# Patient Record
Sex: FEMALE | Race: WHITE | NOT HISPANIC OR LATINO | Employment: UNEMPLOYED | URBAN - METROPOLITAN AREA
[De-identification: names, ages, dates, MRNs, and addresses within clinical notes are randomized per-mention and may not be internally consistent; named-entity substitution may affect disease eponyms.]

---

## 2017-12-12 ENCOUNTER — OFFICE VISIT (OUTPATIENT)
Dept: MEDICAL GROUP | Facility: MEDICAL CENTER | Age: 57
End: 2017-12-12
Payer: COMMERCIAL

## 2017-12-12 VITALS
SYSTOLIC BLOOD PRESSURE: 100 MMHG | HEART RATE: 68 BPM | BODY MASS INDEX: 25.12 KG/M2 | TEMPERATURE: 98.3 F | OXYGEN SATURATION: 98 % | DIASTOLIC BLOOD PRESSURE: 60 MMHG | HEIGHT: 65 IN | WEIGHT: 150.8 LBS

## 2017-12-12 DIAGNOSIS — Z76.89 ENCOUNTER TO ESTABLISH CARE: ICD-10-CM

## 2017-12-12 DIAGNOSIS — Z00.00 ANNUAL PHYSICAL EXAM: ICD-10-CM

## 2017-12-12 DIAGNOSIS — Z00.00 HEALTH CARE MAINTENANCE: ICD-10-CM

## 2017-12-12 PROCEDURE — 99386 PREV VISIT NEW AGE 40-64: CPT | Performed by: INTERNAL MEDICINE

## 2017-12-12 ASSESSMENT — PATIENT HEALTH QUESTIONNAIRE - PHQ9: CLINICAL INTERPRETATION OF PHQ2 SCORE: 0

## 2017-12-12 ASSESSMENT — PAIN SCALES - GENERAL: PAINLEVEL: NO PAIN

## 2017-12-12 NOTE — LETTER
[facility]  Baron Kaminski M.D.  [address]  [place] [address]  Fax: [phone]   Authorization for Release/Disclosure of   Protected Health Information   Name: ROSEMARIE FALK : 1960 SSN: [id number]   Address: [address] [place] [address]  [place] [address] Phone:    [phone] (home)    I authorize the entity listed below to release/disclose the PHI below to:   Critical access hospital/Baron Kaminski M.D. and Baron Kaminski M.D.   Provider or Entity Name:  [facility]   City, State, Zip   Phone:      Fax:        Reason for request: continuity of care   Information to be released:    [ X ] LAST COLONOSCOPY,  including any PATH REPORT and follow-up  [ ] LAST FIT/COLOGUARD RESULT [  ] LAST DEXA  [  ] LAST MAMMOGRAM  [  ] LAST PAP  [  ] LAST LABS [  ] RETINA EXAM REPORT  [  ] IMMUNIZATION RECORDS  [  ] Release all info      [  ] Check here and initial the line next to each item to release ALL health information INCLUDING  _____ Care and treatment for drug and / or alcohol abuse  _____ HIV testing, infection status, or AIDS  _____ Genetic Testing    DATES OF SERVICE OR TIME PERIOD TO BE DISCLOSED: _____________  I understand and acknowledge that:  * This Authorization may be revoked at any time by you in writing, except if your health information has already been used or disclosed.  * Your health information that will be used or disclosed as a result of you signing this authorization could be re-disclosed by the recipient. If this occurs, your re-disclosed health information may no longer be protected by State or Federal laws.  * You may refuse to sign this Authorization. Your refusal will not affect your ability to obtain treatment.  * This Authorization becomes effective upon signing and will  on (date) __________.      If no date is indicated, this Authorization will  one (1) year from the signature date.    Name: Rosemarie Falk    Signature:   Date:          12/12/2017       PLEASE FAX REQUESTED RECORDS BACK TO: [phone]

## 2017-12-12 NOTE — LETTER
formerly [facility]  Baron Kaminski M.D.  [address]  [place] [address]  Fax: [phone]   Authorization for Release/Disclosure of   Protected Health Information   Name: ROSEMARIE CARVAJAL : 1960 SSN: [id number]   Address: [address] [place] [address]  [place] [address] Phone:    [phone] (home)    I authorize the entity listed below to release/disclose the PHI below to:   [facility]/Baron Kaminski M.D. and Baron Kaminski M.D.   Provider or Entity Name:  [facility]   Address   City, State, [facility]   Phone:      Fax:     Reason for request: continuity of care   Information to be released:    [  ] LAST COLONOSCOPY,  including any PATH REPORT and follow-up  [  ] LAST FIT/COLOGUARD RESULT [  ] LAST DEXA  [  ] LAST MAMMOGRAM  [  ] LAST PAP  [  ] LAST LABS [  ] RETINA EXAM REPORT  [  ] IMMUNIZATION RECORDS  [x] Release all info      [  ] Check here and initial the line next to each item to release ALL health information INCLUDING  _____ Care and treatment for drug and / or alcohol abuse  _____ HIV testing, infection status, or AIDS  _____ Genetic Testing    DATES OF SERVICE OR TIME PERIOD TO BE DISCLOSED: _____________  I understand and acknowledge that:  * This Authorization may be revoked at any time by you in writing, except if your health information has already been used or disclosed.  * Your health information that will be used or disclosed as a result of you signing this authorization could be re-disclosed by the recipient. If this occurs, your re-disclosed health information may no longer be protected by State or Federal laws.  * You may refuse to sign this Authorization. Your refusal will not affect your ability to obtain treatment.  * This Authorization becomes effective upon signing and will  on (date) __________.      If no date is indicated, this Authorization will  one (1) year from the signature date.    Name: Rosemarie Mora  Dileep    Signature:   Date:     12/12/2017       PLEASE FAX REQUESTED RECORDS BACK TO: [phone]

## 2017-12-12 NOTE — LETTER
[facility]  Baron Kaminski M.D.  [address]  [place] [address]  Fax: [phone]   Authorization for Release/Disclosure of   Protected Health Information   Name: ROSEMARIE FALK : 1960 SSN: [id number]   Address: [address] [place] [address]  [place] [address] Phone:    [phone] (home)    I authorize the entity listed below to release/disclose the PHI below to:   [facility]/Baron Kaminski M.D. and Baron Kaminski M.D.   Provider or Entity Name:  [facility]   City, State, Zip   Phone:      Fax:     Reason for request: continuity of care   Information to be released:    [  ] LAST COLONOSCOPY,  including any PATH REPORT and follow-up  [  ] LAST FIT/COLOGUARD RESULT [  ] LAST DEXA  [x] LAST MAMMOGRAM  [  ] LAST PAP  [  ] LAST LABS [  ] RETINA EXAM REPORT  [  ] IMMUNIZATION RECORDS  [  ] Release all info      [  ] Check here and initial the line next to each item to release ALL health information INCLUDING  _____ Care and treatment for drug and / or alcohol abuse  _____ HIV testing, infection status, or AIDS  _____ Genetic Testing    DATES OF SERVICE OR TIME PERIOD TO BE DISCLOSED: _____________  I understand and acknowledge that:  * This Authorization may be revoked at any time by you in writing, except if your health information has already been used or disclosed.  * Your health information that will be used or disclosed as a result of you signing this authorization could be re-disclosed by the recipient. If this occurs, your re-disclosed health information may no longer be protected by State or Federal laws.  * You may refuse to sign this Authorization. Your refusal will not affect your ability to obtain treatment.  * This Authorization becomes effective upon signing and will  on (date) __________.      If no date is indicated, this Authorization will  one (1) year from the signature date.    Name: Rosemarie Falk    Signature:   Date:          12/12/2017       PLEASE FAX REQUESTED RECORDS BACK TO: [phone]

## 2017-12-12 NOTE — PROGRESS NOTES
CHIEF COMPLIANT  Rosemarie Falk is a 57 y.o. female who presents for annual exam    Pt has GYN provider: no    Recommendations:  Regular exercise at least 4 days a week  Diet: advised balanced diet  Dental exam at least 1-2 times per year  Sunscreen use: advised    Immunizations:  TdaP:  Pending the records    Colonoscopy: 3 years ago, pending records     GYN  Last PAP:   2016, normal; pending records  Abnormal PAP: no  Last Mammography: 2017.  Pending records  Self breast exam: advised    Menarche:      Postmenopausal.  Denies hot flushes, sweating, vaginal dryness, mood swings.     CURRENT MEDICATIONS  Current Outpatient Prescriptions   Medication Sig Dispense Refill   • multivitamin (THERAGRAN) Tab Take 1 Tab by mouth every day.       No current facility-administered medications for this visit.      ALLERGIES  Allergies: Patient has no known allergies.  PAST MEDICAL HISTORY  She  has no past medical history on file.  SURGICAL HISTORY  She  has no past surgical history on file.  SOCIAL HISTORY  Social History   Substance Use Topics   • Smoking status: Never Smoker   • Smokeless tobacco: Never Used   • Alcohol use No     Social History     Social History Narrative   • No narrative on file     FAMILY HISTORY  History reviewed. No pertinent family history.  No family status information on file.     REVIEW OF SYSTEMS  Constitutional: Denies fever, chills, or sweats  Skin: negative for rash, scaling, itching, pigmentation, hair or nail changes.  Eyes: negative for visual blurring, double vision, eye pain, floaters and discharge from eyes  ENT: negative for tinnitus, vertigo, bleeding gums, dental problem and hoarseness, frequent URI's, sinus trouble, persistent sore throat  Respiratory: negative for persistent cough, hemoptysis, dyspnea, recurrent pneumonia or bronchitis, asthma and wheezing  Cardiovascular: negative for palpitations, tachycardia, irregular heart beat, chest pain, or peripheral edema.  Breast:  "Denies breast tenderness, mass, discharge, changes in size or contour, or abnormal cyclic discomfort.  Gastrointestinal: negative for poor appetite, dysphagia, nausea, heartburn or reflux, abdominal pain, hemorrhoids, constipation or diarrhea  Genitourinary: negative for dysuria, frequency, hesitancy, incontinence, sexually transmitted disease, abnormal vaginal discharge/bleeding, dysparunia   Musculoskeletal: negative for joint swelling and muscle pain/ soreness  Neuro: negative for migraine headaches, involuntary movements or tremor  Psychiatric: negative for excessive alcohol consumption or illegal drug use, sleep problems, anxiety, depression, sexual difficulties  Hematologic/Lymphatic/Immunologic: negative for anemia, unusual bruising, swollen glands, HIV risk factors  Endocrine: negative for temperature intolerance, polydipsia, polyuria.     PHYSICAL EXAMINATION:  Blood pressure 100/60, pulse 68, temperature 36.8 °C (98.3 °F), height 1.645 m (5' 4.75\"), weight 68.4 kg (150 lb 12.8 oz), SpO2 98 %.  Body mass index is 25.29 kg/m².  Wt Readings from Last 4 Encounters:   12/12/17 68.4 kg (150 lb 12.8 oz)       General appearance:healthy, well developed, well nourished, well-groomed  Psych: alert, no distress, cooperative. Eye contact good, speech goal directed. Affect calm.   Eyes: conjunctivae and sclerae normal, lids and lashes normal, EOMI, PERRLA  ENT: Ears: external ears normal to inspection, canals clear. TMs pearly gray with normal light reflex and anatomic landmarks, Nose/Sinuses: nose shows no deformity, asymmetry, or inflammation, nasal mucosa normal, no sinus tenderness,   Oropharynx: lips normal without lesions, buccal mucosa normal, gums healthy, teeth intact, non-carious, palate normal, tongue midline and normal  Neck: no asymmetry, masses, adenopathy. Thyroid normal to palpation, carotids without bruits, no jugular venous distention  Lungs: clear to auscultation with good excursion, Normal " respiratory rate. Chest symmetric no chest wall tenderness.  Cardiovascular: Regular rate and rhythm, no murmur.  No peripheral edema  Abdomen:  Soft, non-tender, no masses, HSM or palpable renal abnormalities. Bowel sounds normal, no bruits heard. No CVAT.  Musculoskeletal: no evidence of joint effusion, ROM of all joints is normal, no crepitation detected, strength grossly normal UE and LE, proximal and distal motor groups. No deformities present  Lymphatic: None significantly enlarged supraclavicular, axillary, or inguinal  Skin: color normal, vascularity normal, no rashes or suspicious lesions, no evidence of bleeding or bruising  Neuro: speech normal, mental status intact, gait grossly normal, muscle tone normal.    LABS    Pending    ASSESSMENT/PLAN    1. Annual physical exam  Healthy adult, no medications.    Reviewed PMH, PSH, FH, SH, ALL, MEDS, HCM/IMM.   Advised healthy habits, diet, exercise.  - CBC WITH DIFFERENTIAL; Future  - COMP METABOLIC PANEL; Future  - LIPID PROFILE; Future  - TSH; Future  - VITAMIN D,25 HYDROXY; Future    2. Health care maintenance  Pending records  - CBC WITH DIFFERENTIAL; Future  - COMP METABOLIC PANEL; Future  - LIPID PROFILE; Future  - TSH; Future  - VITAMIN D,25 HYDROXY; Future    3. Encounter to establish care  Release form for old records: signed    Smoking Counseling: Nonsmoker    Next office visit is due in  1 year    All questions are answered.     Please note that this dictation was created using voice recognition software. Despite all efforts, some minor grammar mistakes are possible.

## 2017-12-12 NOTE — LETTER
[facility]  Baron Kaminski M.D.  [address]  [place] [address]  Fax: [phone]   Authorization for Release/Disclosure of   Protected Health Information   Name: ROSEMARIE FALK : 1960 SSN: [id number]   Address: [address] [place] [address]  [place] [address] Phone:    [phone] (home)    I authorize the entity listed below to release/disclose the PHI below to:   [facility]/Baron Kaminski M.D. and Barno Kaminski M.D.   Provider or Entity Name:     Address   City, State, Zip   Phone:      Fax:     Reason for request: continuity of care   Information to be released:    [  ] LAST COLONOSCOPY,  including any PATH REPORT and follow-up  [  ] LAST FIT/COLOGUARD RESULT [  ] LAST DEXA  [  ] LAST MAMMOGRAM  [x] LAST PAP  [  ] LAST LABS [  ] RETINA EXAM REPORT  [  ] IMMUNIZATION RECORDS  [  ] Release all info      [  ] Check here and initial the line next to each item to release ALL health information INCLUDING  _____ Care and treatment for drug and / or alcohol abuse  _____ HIV testing, infection status, or AIDS  _____ Genetic Testing    DATES OF SERVICE OR TIME PERIOD TO BE DISCLOSED: _____________  I understand and acknowledge that:  * This Authorization may be revoked at any time by you in writing, except if your health information has already been used or disclosed.  * Your health information that will be used or disclosed as a result of you signing this authorization could be re-disclosed by the recipient. If this occurs, your re-disclosed health information may no longer be protected by State or Federal laws.  * You may refuse to sign this Authorization. Your refusal will not affect your ability to obtain treatment.  * This Authorization becomes effective upon signing and will  on (date) __________.      If no date is indicated, this Authorization will  one (1) year from the signature date.    Name: Rosemarie Falk    Signature:   Date:      12/12/2017       PLEASE FAX REQUESTED RECORDS BACK TO: [phone]

## 2018-03-14 ENCOUNTER — HOSPITAL ENCOUNTER (OUTPATIENT)
Dept: LAB | Facility: MEDICAL CENTER | Age: 58
End: 2018-03-14
Attending: INTERNAL MEDICINE
Payer: COMMERCIAL

## 2018-03-14 DIAGNOSIS — Z00.00 ANNUAL PHYSICAL EXAM: ICD-10-CM

## 2018-03-14 DIAGNOSIS — Z00.00 HEALTH CARE MAINTENANCE: ICD-10-CM

## 2018-03-14 LAB
25(OH)D3 SERPL-MCNC: 51 NG/ML (ref 30–100)
ALBUMIN SERPL BCP-MCNC: 4.4 G/DL (ref 3.2–4.9)
ALBUMIN/GLOB SERPL: 1.6 G/DL
ALP SERPL-CCNC: 33 U/L (ref 30–99)
ALT SERPL-CCNC: 16 U/L (ref 2–50)
ANION GAP SERPL CALC-SCNC: 5 MMOL/L (ref 0–11.9)
AST SERPL-CCNC: 23 U/L (ref 12–45)
BASOPHILS # BLD AUTO: 1.2 % (ref 0–1.8)
BASOPHILS # BLD: 0.06 K/UL (ref 0–0.12)
BILIRUB SERPL-MCNC: 0.7 MG/DL (ref 0.1–1.5)
BUN SERPL-MCNC: 14 MG/DL (ref 8–22)
CALCIUM SERPL-MCNC: 10.2 MG/DL (ref 8.5–10.5)
CHLORIDE SERPL-SCNC: 103 MMOL/L (ref 96–112)
CHOLEST SERPL-MCNC: 254 MG/DL (ref 100–199)
CO2 SERPL-SCNC: 29 MMOL/L (ref 20–33)
CREAT SERPL-MCNC: 0.75 MG/DL (ref 0.5–1.4)
EOSINOPHIL # BLD AUTO: 0.12 K/UL (ref 0–0.51)
EOSINOPHIL NFR BLD: 2.3 % (ref 0–6.9)
ERYTHROCYTE [DISTWIDTH] IN BLOOD BY AUTOMATED COUNT: 41.1 FL (ref 35.9–50)
GLOBULIN SER CALC-MCNC: 2.8 G/DL (ref 1.9–3.5)
GLUCOSE SERPL-MCNC: 92 MG/DL (ref 65–99)
HCT VFR BLD AUTO: 42.3 % (ref 37–47)
HDLC SERPL-MCNC: 71 MG/DL
HGB BLD-MCNC: 13.9 G/DL (ref 12–16)
IMM GRANULOCYTES # BLD AUTO: 0.01 K/UL (ref 0–0.11)
IMM GRANULOCYTES NFR BLD AUTO: 0.2 % (ref 0–0.9)
LDLC SERPL CALC-MCNC: 171 MG/DL
LYMPHOCYTES # BLD AUTO: 1.91 K/UL (ref 1–4.8)
LYMPHOCYTES NFR BLD: 37.2 % (ref 22–41)
MCH RBC QN AUTO: 28.1 PG (ref 27–33)
MCHC RBC AUTO-ENTMCNC: 32.9 G/DL (ref 33.6–35)
MCV RBC AUTO: 85.5 FL (ref 81.4–97.8)
MONOCYTES # BLD AUTO: 0.46 K/UL (ref 0–0.85)
MONOCYTES NFR BLD AUTO: 9 % (ref 0–13.4)
NEUTROPHILS # BLD AUTO: 2.57 K/UL (ref 2–7.15)
NEUTROPHILS NFR BLD: 50.1 % (ref 44–72)
NRBC # BLD AUTO: 0 K/UL
NRBC BLD-RTO: 0 /100 WBC
PLATELET # BLD AUTO: 324 K/UL (ref 164–446)
PMV BLD AUTO: 10.7 FL (ref 9–12.9)
POTASSIUM SERPL-SCNC: 4.4 MMOL/L (ref 3.6–5.5)
PROT SERPL-MCNC: 7.2 G/DL (ref 6–8.2)
RBC # BLD AUTO: 4.95 M/UL (ref 4.2–5.4)
SODIUM SERPL-SCNC: 137 MMOL/L (ref 135–145)
TRIGL SERPL-MCNC: 60 MG/DL (ref 0–149)
TSH SERPL DL<=0.005 MIU/L-ACNC: 2.65 UIU/ML (ref 0.38–5.33)
WBC # BLD AUTO: 5.1 K/UL (ref 4.8–10.8)

## 2018-03-14 PROCEDURE — 85025 COMPLETE CBC W/AUTO DIFF WBC: CPT

## 2018-03-14 PROCEDURE — 36415 COLL VENOUS BLD VENIPUNCTURE: CPT

## 2018-03-14 PROCEDURE — 80053 COMPREHEN METABOLIC PANEL: CPT

## 2018-03-14 PROCEDURE — 84443 ASSAY THYROID STIM HORMONE: CPT

## 2018-03-14 PROCEDURE — 80061 LIPID PANEL: CPT

## 2018-03-14 PROCEDURE — 82306 VITAMIN D 25 HYDROXY: CPT

## 2018-08-15 RX ORDER — NEOMYCIN SULFATE, POLYMYXIN B SULFATE AND DEXAMETHASONE 3.5; 10000; 1 MG/ML; [USP'U]/ML; MG/ML
SUSPENSION/ DROPS OPHTHALMIC
COMMUNITY
Start: 2017-08-17 | End: 2018-08-16

## 2018-08-16 ENCOUNTER — OFFICE VISIT (OUTPATIENT)
Dept: MEDICAL GROUP | Facility: MEDICAL CENTER | Age: 58
End: 2018-08-16
Payer: COMMERCIAL

## 2018-08-16 ENCOUNTER — HOSPITAL ENCOUNTER (OUTPATIENT)
Facility: MEDICAL CENTER | Age: 58
End: 2018-08-16
Attending: INTERNAL MEDICINE
Payer: COMMERCIAL

## 2018-08-16 VITALS
DIASTOLIC BLOOD PRESSURE: 72 MMHG | OXYGEN SATURATION: 97 % | HEART RATE: 68 BPM | TEMPERATURE: 98.7 F | HEIGHT: 65 IN | SYSTOLIC BLOOD PRESSURE: 102 MMHG | WEIGHT: 149.69 LBS | BODY MASS INDEX: 24.94 KG/M2

## 2018-08-16 DIAGNOSIS — Z00.00 HEALTH CARE MAINTENANCE: ICD-10-CM

## 2018-08-16 DIAGNOSIS — Z12.4 SCREENING FOR MALIGNANT NEOPLASM OF CERVIX: ICD-10-CM

## 2018-08-16 DIAGNOSIS — M71.22 SYNOVIAL CYST OF LEFT POPLITEAL SPACE: ICD-10-CM

## 2018-08-16 DIAGNOSIS — Z01.419 WELL FEMALE EXAM WITH ROUTINE GYNECOLOGICAL EXAM: ICD-10-CM

## 2018-08-16 PROCEDURE — 88175 CYTOPATH C/V AUTO FLUID REDO: CPT

## 2018-08-16 PROCEDURE — 87624 HPV HI-RISK TYP POOLED RSLT: CPT

## 2018-08-16 PROCEDURE — 99214 OFFICE O/P EST MOD 30 MIN: CPT | Mod: 25 | Performed by: INTERNAL MEDICINE

## 2018-08-16 PROCEDURE — 99000 SPECIMEN HANDLING OFFICE-LAB: CPT | Performed by: INTERNAL MEDICINE

## 2018-08-16 PROCEDURE — 99396 PREV VISIT EST AGE 40-64: CPT | Performed by: INTERNAL MEDICINE

## 2018-08-16 NOTE — PROGRESS NOTES
CHIEF COMPLIANT:  Rosemarie Falk is a 58 y.o. female who presents today due to synovial cyst  She would like also to do PAP for annual exam    Pt has GYN provider: no    Recommendations:  Regular exercise at least 4 days a week  Diet: advised balanced diet  Dental exam at least 1-2 times per year  Sunscreen use: advised    Immunizations:  TdaP:  10/2011  Shingless vaccine: 9 advised    Colonoscopy:  Bone density test:   Gardiasil:     GYN  Last PAP:   normal   Abnormal PAP: no  Last Mammography: Pending    Postmenopausal  Denies hot flushes, sweating, vaginal dryness, mood swings.     Left popliteal swelling  · Onset: 3-4 weeks ago  · Location: left on the left side, more laterally  · Gradually increasing in size.  · Bending of the knee is affected due to swelling.    Denies:  · Trauma  · Fever, chills, redness, pain, TTP  · Numbness, tingling    CURRENT MEDICATIONS  Current Outpatient Prescriptions   Medication Sig Dispense Refill   • tretinoin (RETIN-A) 0.025 % cream Apply  to affected area(s).     • multivitamin (THERAGRAN) Tab Take 1 Tab by mouth every day.       No current facility-administered medications for this visit.      ALLERGIES  Allergies: Patient has no known allergies.  PAST MEDICAL HISTORY  She  has no past medical history on file.  SURGICAL HISTORY  She  has no past surgical history on file.  SOCIAL HISTORY  Social History   Substance Use Topics   • Smoking status: Never Smoker   • Smokeless tobacco: Never Used   • Alcohol use No     Social History     Social History Narrative   • No narrative on file     FAMILY HISTORY  History reviewed. No pertinent family history.  No family status information on file.     REVIEW OF SYSTEMS  Constitutional: Denies fever, chills, or sweats  Skin: negative for rash, scaling, itching, pigmentation, hair or nail changes.  Eyes: negative for visual blurring, double vision, eye pain, floaters and discharge from eyes  ENT: negative for tinnitus, vertigo,  "bleeding gums, dental problem and hoarseness, frequent URI's, sinus trouble, persistent sore throat  Respiratory: negative for persistent cough, hemoptysis, dyspnea, recurrent pneumonia or bronchitis, asthma and wheezing  Cardiovascular: negative for palpitations, tachycardia, irregular heart beat, chest pain, or peripheral edema.  Breast: Denies breast tenderness, mass, discharge, changes in size or contour, or abnormal cyclic discomfort.  Gastrointestinal: negative for poor appetite, dysphagia, nausea, heartburn or reflux, abdominal pain, hemorrhoids, constipation or diarrhea  Genitourinary: negative for dysuria, frequency, hesitancy, incontinence, sexually transmitted disease, abnormal vaginal discharge/bleeding, dysparunia   Musculoskeletal: negative for muscle pain/ soreness. And per HPI.  Neuro: negative for migraine headaches, involuntary movements or tremor  Psychiatric: negative for excessive alcohol consumption or illegal drug use, sleep problems, anxiety, depression, sexual difficulties  Hematologic/Lymphatic/Immunologic: negative for anemia, unusual bruising, swollen glands, HIV risk factors  Endocrine: negative for temperature intolerance, polydipsia, polyuria.     PHYSICAL EXAMINATION:  Blood pressure 102/72, pulse 68, temperature 37.1 °C (98.7 °F), height 1.651 m (5' 5\"), weight 67.9 kg (149 lb 11.1 oz), SpO2 97 %.  Body mass index is 24.91 kg/m².  Wt Readings from Last 4 Encounters:   08/16/18 67.9 kg (149 lb 11.1 oz)   12/12/17 68.4 kg (150 lb 12.8 oz)     General appearance:healthy, well developed, well nourished, well-groomed  Psych: alert, no distress, cooperative. Eye contact good, speech goal directed. Affect calm.   Eyes: conjunctivae and sclerae normal, lids and lashes normal, EOMI, PERRLA  ENT: Ears: external ears normal to inspection, canals clear. TMs pearly gray with normal light reflex and anatomic landmarks, Nose/Sinuses: nose shows no deformity, asymmetry, or inflammation, nasal mucosa " normal, no sinus tenderness,   Oropharynx: lips normal without lesions, buccal mucosa normal, gums healthy, teeth intact, non-carious, palate normal, tongue midline and normal  Neck: no asymmetry, masses, adenopathy. Thyroid normal to palpation, carotids without bruits, no jugular venous distention  Lungs: clear to auscultation with good excursion, Normal respiratory rate. Chest symmetric no chest wall tenderness.  Cardiovascular: Regular rate and rhythm, no murmur.  No peripheral edema  Breasts examined seated and supine. No skin changes, peau d'orange or nipple retraction. No axillary or supraclavicular adenopathy. No dominant, discrete, fixed, or suspicious masses found.    Abdomen:  Soft, non-tender, no masses, HSM or palpable renal abnormalities. Bowel sounds normal, no bruits heard. No CVAT.  Musculoskeletal:   Swelling/cyst like structure over the medial aspect of the left popliteal fossa, without skin change, redness, TTP.  no evidence of joint effusion,  no crepitation detected, strength grossly normal UE and LE, proximal and distal motor groups. No deformities present.  Lymphatic: None significantly enlarged supraclavicular, axillary, or inguinal  Skin: color normal, vascularity normal, no rashes or suspicious lesions, no evidence of bleeding or bruising  Neuro: speech normal, mental status intact, gait grossly normal, muscle tone normal.  GYN: No suprapubic tenderness.  Perineum and external genitalia normal without rash.   Vagina with without discharge, normal mucosa.  Cervix w/o visible lesions or discharge. No CMT  Uterus normal size, non-tender, no masses,   Adnexa w/o mass or tenderness.   PAP smear was obtained.    LABS    Labs are reviewed and discussed with a patient  Lab Results   Component Value Date/Time    CHOLSTRLTOT 254 (H) 03/14/2018 07:09 AM     (H) 03/14/2018 07:09 AM    HDL 71 03/14/2018 07:09 AM    TRIGLYCERIDE 60 03/14/2018 07:09 AM       Lab Results   Component Value Date/Time     SODIUM 137 03/14/2018 07:09 AM    POTASSIUM 4.4 03/14/2018 07:09 AM    CHLORIDE 103 03/14/2018 07:09 AM    CO2 29 03/14/2018 07:09 AM    GLUCOSE 92 03/14/2018 07:09 AM    BUN 14 03/14/2018 07:09 AM    CREATININE 0.75 03/14/2018 07:09 AM     Lab Results   Component Value Date/Time    ALKPHOSPHAT 33 03/14/2018 07:09 AM    ASTSGOT 23 03/14/2018 07:09 AM    ALTSGPT 16 03/14/2018 07:09 AM    TBILIRUBIN 0.7 03/14/2018 07:09 AM      Lab Results   Component Value Date/Time    WBC 5.1 03/14/2018 07:09 AM    RBC 4.95 03/14/2018 07:09 AM    HEMOGLOBIN 13.9 03/14/2018 07:09 AM    HEMATOCRIT 42.3 03/14/2018 07:09 AM    MCV 85.5 03/14/2018 07:09 AM    MCH 28.1 03/14/2018 07:09 AM    MCHC 32.9 (L) 03/14/2018 07:09 AM    MPV 10.7 03/14/2018 07:09 AM    NEUTSPOLYS 50.10 03/14/2018 07:09 AM    LYMPHOCYTES 37.20 03/14/2018 07:09 AM    MONOCYTES 9.00 03/14/2018 07:09 AM    EOSINOPHILS 2.30 03/14/2018 07:09 AM    BASOPHILS 1.20 03/14/2018 07:09 AM      ASSESSMENT/PLAN    1. Well female exam with routine gynecological exam  - THINPREP PAP WITH HPV; Future    2. Screening for malignant neoplasm of cervix  - THINPREP PAP WITH HPV; Future    3. Health care maintenance  As above.    4. Synovial cyst of left popliteal space  Showed her image from Internet, discussed about natural course.  - REFERRAL TO ORTHOPEDICS  - US-EXTREMITY NON VASCULAR UNILATERAL LEFT; Future    Smoking Counseling: Nonsmoker    Next office visit is due in  1 year and prn    All questions are answered.     Please note that this dictation was created using voice recognition software. Despite all efforts, some minor grammar mistakes are possible.

## 2018-08-17 ENCOUNTER — HOSPITAL ENCOUNTER (OUTPATIENT)
Dept: RADIOLOGY | Facility: MEDICAL CENTER | Age: 58
End: 2018-08-17
Attending: INTERNAL MEDICINE
Payer: COMMERCIAL

## 2018-08-17 DIAGNOSIS — Z12.4 SCREENING FOR MALIGNANT NEOPLASM OF CERVIX: ICD-10-CM

## 2018-08-17 DIAGNOSIS — Z01.419 WELL FEMALE EXAM WITH ROUTINE GYNECOLOGICAL EXAM: ICD-10-CM

## 2018-08-17 DIAGNOSIS — Z12.31 VISIT FOR SCREENING MAMMOGRAM: ICD-10-CM

## 2018-08-17 PROCEDURE — 77067 SCR MAMMO BI INCL CAD: CPT

## 2018-08-20 ENCOUNTER — HOSPITAL ENCOUNTER (OUTPATIENT)
Dept: RADIOLOGY | Facility: MEDICAL CENTER | Age: 58
End: 2018-08-20
Attending: INTERNAL MEDICINE
Payer: COMMERCIAL

## 2018-08-20 DIAGNOSIS — M71.22 SYNOVIAL CYST OF LEFT POPLITEAL SPACE: ICD-10-CM

## 2018-08-20 LAB
CYTOLOGY REG CYTOL: NORMAL
HPV HR 12 DNA CVX QL NAA+PROBE: NEGATIVE
HPV16 DNA SPEC QL NAA+PROBE: NEGATIVE
HPV18 DNA SPEC QL NAA+PROBE: NEGATIVE
SPECIMEN SOURCE: NORMAL

## 2018-08-20 PROCEDURE — 76882 US LMTD JT/FCL EVL NVASC XTR: CPT | Mod: LT

## 2018-08-30 ENCOUNTER — HOSPITAL ENCOUNTER (OUTPATIENT)
Dept: RADIOLOGY | Facility: MEDICAL CENTER | Age: 58
End: 2018-08-30

## 2019-02-08 DIAGNOSIS — Z12.83 SCREENING, MALIGNANT NEOPLASM, SKIN: ICD-10-CM

## 2019-02-17 DIAGNOSIS — E78.00 HYPERCHOLESTEROLEMIA: ICD-10-CM

## 2019-02-22 DIAGNOSIS — Z71.84 TRAVEL ADVICE ENCOUNTER: ICD-10-CM

## 2019-03-18 DIAGNOSIS — Z12.11 SCREENING FOR MALIGNANT NEOPLASM OF COLON: ICD-10-CM

## 2019-05-31 ENCOUNTER — APPOINTMENT (OUTPATIENT)
Dept: MEDICAL GROUP | Facility: MEDICAL CENTER | Age: 59
End: 2019-05-31
Payer: COMMERCIAL

## 2019-06-04 ENCOUNTER — OFFICE VISIT (OUTPATIENT)
Dept: MEDICAL GROUP | Facility: MEDICAL CENTER | Age: 59
End: 2019-06-04
Payer: COMMERCIAL

## 2019-06-04 VITALS
OXYGEN SATURATION: 97 % | TEMPERATURE: 97.2 F | BODY MASS INDEX: 23.56 KG/M2 | HEIGHT: 66 IN | HEART RATE: 67 BPM | DIASTOLIC BLOOD PRESSURE: 70 MMHG | WEIGHT: 146.61 LBS | SYSTOLIC BLOOD PRESSURE: 100 MMHG

## 2019-06-04 DIAGNOSIS — M62.838 MUSCLE SPASM: ICD-10-CM

## 2019-06-04 DIAGNOSIS — G89.29 CHRONIC LUMBOSACRAL PAIN: ICD-10-CM

## 2019-06-04 DIAGNOSIS — M54.50 CHRONIC LUMBOSACRAL PAIN: ICD-10-CM

## 2019-06-04 DIAGNOSIS — Z00.00 HEALTH CARE MAINTENANCE: ICD-10-CM

## 2019-06-04 DIAGNOSIS — Z11.59 NEED FOR HEPATITIS C SCREENING TEST: ICD-10-CM

## 2019-06-04 PROCEDURE — 99214 OFFICE O/P EST MOD 30 MIN: CPT | Performed by: INTERNAL MEDICINE

## 2019-06-04 RX ORDER — MELOXICAM 15 MG/1
15 TABLET ORAL DAILY
Qty: 60 TAB | Refills: 0 | Status: SHIPPED | OUTPATIENT
Start: 2019-06-04 | End: 2020-04-28

## 2019-06-04 RX ORDER — TIZANIDINE 4 MG/1
4 TABLET ORAL EVERY 6 HOURS PRN
Qty: 30 TAB | Refills: 3 | Status: SHIPPED | OUTPATIENT
Start: 2019-06-04 | End: 2020-04-28

## 2019-06-04 ASSESSMENT — PATIENT HEALTH QUESTIONNAIRE - PHQ9: CLINICAL INTERPRETATION OF PHQ2 SCORE: 0

## 2019-06-04 NOTE — PROGRESS NOTES
CHIEF COMPLAINT  LBP    HPI  Patient is a 59 y.o. female patient who presents today for the following     Lower back pain  New problem.  - Onset: remote   - worse in the last few mos  - Triger: no trauma  - located in: lower back  - intensity:  mild to moderate  - quality:  dull   - radiation:  no  - alleviating factors are:  rest, massage  -  exacerbating factors are:  activity  - accompanied:    - TTP   - no numbness, weakness, tingling, fever, chills  - course: as above  - imaging: none  - treatment: as above      Reviewed PMH, PSH, FH, SH, ALL, HCM/IMM, no changes  Reviewed MEDS, no changes    Patient Active Problem List    Diagnosis Date Noted   • Hypercholesterolemia 02/17/2019   • Synovial cyst of left popliteal space 08/16/2018   • Health care maintenance 12/12/2017     CURRENT MEDICATIONS  Current Outpatient Prescriptions   Medication Sig Dispense Refill   • tizanidine (ZANAFLEX) 4 MG Tab Take 1 Tab by mouth every 6 hours as needed. 30 Tab 3   • multivitamin (THERAGRAN) Tab Take 1 Tab by mouth every day.       No current facility-administered medications for this visit.      ALLERGIES  Allergies: Patient has no known allergies.  PAST MEDICAL HISTORY  No past medical history on file.  SURGICAL HISTORY  She  has no past surgical history on file.  SOCIAL HISTORY  Social History   Substance Use Topics   • Smoking status: Never Smoker   • Smokeless tobacco: Never Used   • Alcohol use No     Social History     Social History Narrative   • No narrative on file     FAMILY HISTORY  History reviewed. No pertinent family history.  No family status information on file.     ROS   Constitutional: Negative for fever, chills.  Eyes: Negative for vision problems.   Respiratory: Negative for cough, shortness of breath.  Cardiovascular: Negative for chest pain, palpitations.   Gastrointestinal: Negative for heartburn, nausea, abdominal pain.   Genitourinary: Negative for dysuria.  Musculoskeletal: as above.  Skin: Negative  "for rash and itching.   Neuro: Negative for dizziness, weakness and headaches.   Endo/Heme/Allergies: Does not bruise/bleed easily.   Psychiatric/Behavioral: Negative for depression.    PHYSICAL EXAM   /70 (BP Location: Left arm, Patient Position: Sitting, BP Cuff Size: Adult)   Pulse 67   Temp 36.2 °C (97.2 °F) (Temporal)   Ht 1.664 m (5' 5.5\")   Wt 66.5 kg (146 lb 9.7 oz)   SpO2 97%  Body mass index is 24.03 kg/m².  General:  NAD, well appearing  HEENT:   NC/AT, PERRLA, EOMI, TMs are clear. Oropharyngeal mucosa is pink,  without lesions;  no cervical / supraclavicular  lymphadenopathy, no thyromegaly.    Cardiovascular: RRR.   No m/r/g. No carotid bruits .      Lungs:   CTAB, no w/r/r, no respiratory distress.  Abdomen: Soft, NT/ND + BS; no suprapubic tenderness; no masses or hepatosplenomegaly.  Extremities:  2+ DP and radial pulses bilaterally.  No c/c/e.   Skin:  Warm, dry.  No erythema. No rash.   Neurologic: Alert & oriented x 3. CN II-XII grossly intact. Brachioradialis / knee DTR are 2/4, symmetric. Strength and sensation grossly intact.  No focal deficits.  Psychiatric:  Affect normal, mood normal, judgment normal.  MS: no TTP over spine/paraspinal muscles; mild muscle spasm.    LABS     Labs are reviewed and discussed with a patient  Lab Results   Component Value Date/Time    CHOLSTRLTOT 254 (H) 03/14/2018 07:09 AM     (H) 03/14/2018 07:09 AM    HDL 71 03/14/2018 07:09 AM    TRIGLYCERIDE 60 03/14/2018 07:09 AM       Lab Results   Component Value Date/Time    SODIUM 137 03/14/2018 07:09 AM    POTASSIUM 4.4 03/14/2018 07:09 AM    CHLORIDE 103 03/14/2018 07:09 AM    CO2 29 03/14/2018 07:09 AM    GLUCOSE 92 03/14/2018 07:09 AM    BUN 14 03/14/2018 07:09 AM    CREATININE 0.75 03/14/2018 07:09 AM     Lab Results   Component Value Date/Time    ALKPHOSPHAT 33 03/14/2018 07:09 AM    ASTSGOT 23 03/14/2018 07:09 AM    ALTSGPT 16 03/14/2018 07:09 AM    TBILIRUBIN 0.7 03/14/2018 07:09 AM      Lab " Results   Component Value Date/Time    WBC 5.1 03/14/2018 07:09 AM    RBC 4.95 03/14/2018 07:09 AM    HEMOGLOBIN 13.9 03/14/2018 07:09 AM    HEMATOCRIT 42.3 03/14/2018 07:09 AM    MCV 85.5 03/14/2018 07:09 AM    MCH 28.1 03/14/2018 07:09 AM    MCHC 32.9 (L) 03/14/2018 07:09 AM    MPV 10.7 03/14/2018 07:09 AM    NEUTSPOLYS 50.10 03/14/2018 07:09 AM    LYMPHOCYTES 37.20 03/14/2018 07:09 AM    MONOCYTES 9.00 03/14/2018 07:09 AM    EOSINOPHILS 2.30 03/14/2018 07:09 AM    BASOPHILS 1.20 03/14/2018 07:09 AM      IMAGING     None    ASSESMENT AND PLAN        1. Chronic lumbosacral pain  Advised to continue activity as tolerated  - REFERRAL TO PHYSICAL THERAPY Reason for Therapy: Eval/Treat/Report  - meloxicam (MOBIC) 15 MG tablet; Take 1 Tab by mouth every day.  Dispense: 60 Tab; Refill: 0    2. Muscle spasm  Trial of muscle relaxant  - tizanidine (ZANAFLEX) 4 MG Tab; Take 1 Tab by mouth every 6 hours as needed.  Dispense: 30 Tab; Refill: 3  - REFERRAL TO PHYSICAL THERAPY Reason for Therapy: Eval/Treat/Report    3. Health care maintenance  4. Need for hepatitis C screening test  - HEP C VIRUS ANTIBODY; Future    Counseling:   - Smoking:  Nonsmoker    Followup: Return if symptoms worsen or fail to improve.    All questions are answered.    Please note that this dictation was created using voice recognition software, and that there might be errors of kenney and possibly content.

## 2019-06-11 ENCOUNTER — HOSPITAL ENCOUNTER (OUTPATIENT)
Dept: LAB | Facility: MEDICAL CENTER | Age: 59
End: 2019-06-11
Attending: INTERNAL MEDICINE
Payer: COMMERCIAL

## 2019-06-11 DIAGNOSIS — Z11.59 NEED FOR HEPATITIS C SCREENING TEST: ICD-10-CM

## 2019-06-11 DIAGNOSIS — E78.00 HYPERCHOLESTEROLEMIA: ICD-10-CM

## 2019-06-11 LAB
ALBUMIN SERPL BCP-MCNC: 4.4 G/DL (ref 3.2–4.9)
ALBUMIN/GLOB SERPL: 1.5 G/DL
ALP SERPL-CCNC: 36 U/L (ref 30–99)
ALT SERPL-CCNC: 17 U/L (ref 2–50)
ANION GAP SERPL CALC-SCNC: 7 MMOL/L (ref 0–11.9)
AST SERPL-CCNC: 21 U/L (ref 12–45)
BILIRUB SERPL-MCNC: 0.5 MG/DL (ref 0.1–1.5)
BUN SERPL-MCNC: 17 MG/DL (ref 8–22)
CALCIUM SERPL-MCNC: 9.7 MG/DL (ref 8.5–10.5)
CHLORIDE SERPL-SCNC: 106 MMOL/L (ref 96–112)
CHOLEST SERPL-MCNC: 247 MG/DL (ref 100–199)
CO2 SERPL-SCNC: 28 MMOL/L (ref 20–33)
CREAT SERPL-MCNC: 0.76 MG/DL (ref 0.5–1.4)
FASTING STATUS PATIENT QL REPORTED: NORMAL
GLOBULIN SER CALC-MCNC: 3 G/DL (ref 1.9–3.5)
GLUCOSE SERPL-MCNC: 85 MG/DL (ref 65–99)
HCV AB SER QL: NEGATIVE
HDLC SERPL-MCNC: 69 MG/DL
LDLC SERPL CALC-MCNC: 163 MG/DL
POTASSIUM SERPL-SCNC: 4.2 MMOL/L (ref 3.6–5.5)
PROT SERPL-MCNC: 7.4 G/DL (ref 6–8.2)
SODIUM SERPL-SCNC: 141 MMOL/L (ref 135–145)
TRIGL SERPL-MCNC: 77 MG/DL (ref 0–149)

## 2019-06-11 PROCEDURE — 80053 COMPREHEN METABOLIC PANEL: CPT

## 2019-06-11 PROCEDURE — 36415 COLL VENOUS BLD VENIPUNCTURE: CPT

## 2019-06-11 PROCEDURE — 80061 LIPID PANEL: CPT

## 2019-06-11 PROCEDURE — 86803 HEPATITIS C AB TEST: CPT

## 2019-06-26 ENCOUNTER — OFFICE VISIT (OUTPATIENT)
Dept: MEDICAL GROUP | Facility: MEDICAL CENTER | Age: 59
End: 2019-06-26
Payer: COMMERCIAL

## 2019-06-26 VITALS
BODY MASS INDEX: 23.31 KG/M2 | OXYGEN SATURATION: 98 % | WEIGHT: 145.06 LBS | TEMPERATURE: 97.4 F | SYSTOLIC BLOOD PRESSURE: 100 MMHG | DIASTOLIC BLOOD PRESSURE: 66 MMHG | HEART RATE: 74 BPM | HEIGHT: 66 IN

## 2019-06-26 DIAGNOSIS — Z12.31 ENCOUNTER FOR SCREENING MAMMOGRAM FOR MALIGNANT NEOPLASM OF BREAST: ICD-10-CM

## 2019-06-26 DIAGNOSIS — M71.22 SYNOVIAL CYST OF LEFT POPLITEAL SPACE: ICD-10-CM

## 2019-06-26 DIAGNOSIS — Z78.0 MENOPAUSE: ICD-10-CM

## 2019-06-26 DIAGNOSIS — E78.00 HYPERCHOLESTEROLEMIA: ICD-10-CM

## 2019-06-26 DIAGNOSIS — Z00.00 ANNUAL PHYSICAL EXAM: ICD-10-CM

## 2019-06-26 DIAGNOSIS — Z00.00 HEALTH CARE MAINTENANCE: ICD-10-CM

## 2019-06-26 DIAGNOSIS — M54.50 CHRONIC LUMBOSACRAL PAIN: ICD-10-CM

## 2019-06-26 DIAGNOSIS — G89.29 CHRONIC LUMBOSACRAL PAIN: ICD-10-CM

## 2019-06-26 PROCEDURE — 99396 PREV VISIT EST AGE 40-64: CPT | Performed by: INTERNAL MEDICINE

## 2019-06-26 NOTE — PROGRESS NOTES
CHIEF COMPLIANT:  Rosemarie Falk is a 59 y.o. female who presents for annual exam    Pt has GYN provider: no     Recommendations:  Regular exercise at least 4 days a week  Diet: advised balanced diet  Dental exam at least 1-2 times per year  Sunscreen use: advised     Immunizations:  TdaP: 10/2011  Shingless vaccine: 9 advised     Colonoscopy: 4/2019  Bone density test: given order    GYN  Last PAP: 8/2018, normal   Abnormal PAP: no  Last Mammography: 8/2018     Postmenopausal  Denies hot flushes, sweating, vaginal dryness, mood swings.      Hypercholesterolemia  Meds: Started omega-3, will start niacin ER   - taking daily, as prescribed. No myalgias, muscle cramps or pain.    Diet / Exercise/BMI:  As above  FH: Father     Left popliteal swelling/cyst  Onset: Mid 2018.  No significant symptoms.    LBP  C/o pain in lumbosacral area.  Improved with myofascial massage.    CURRENT MEDICATIONS  Current Outpatient Prescriptions   Medication Sig Dispense Refill   • multivitamin (THERAGRAN) Tab Take 1 Tab by mouth every day.     • tizanidine (ZANAFLEX) 4 MG Tab Take 1 Tab by mouth every 6 hours as needed. (Patient not taking: Reported on 6/26/2019) 30 Tab 3   • meloxicam (MOBIC) 15 MG tablet Take 1 Tab by mouth every day. (Patient not taking: Reported on 6/26/2019) 60 Tab 0     No current facility-administered medications for this visit.      ALLERGIES  Allergies: Patient has no known allergies.  PAST MEDICAL HISTORY  She  has a past medical history of Hypercholesterolemia.  SURGICAL HISTORY  She  has no past surgical history on file.  SOCIAL HISTORY  Social History   Substance Use Topics   • Smoking status: Never Smoker   • Smokeless tobacco: Never Used   • Alcohol use No     Social History     Social History Narrative   • No narrative on file     FAMILY HISTORY  History reviewed. No pertinent family history.  No family status information on file.     REVIEW OF SYSTEMS  Constitutional: Denies fever, chills,  "fatigue.  Skin: negative for rash, scaling, itching, pigmentation, hair or nail changes.  Eyes: negative for blurred vision, eye pain, floaters and discharge from eyes.  ENT: negative for hearing problems, tinnitus, vertigo.  Respiratory: negative for persistent cough, dyspnea, wheezing, SOB.  Cardiovascular: negative for palpitations, tachycardia, irregular heart beats, chest pain, or peripheral edema.  Breast: Denies breast tenderness, mass, discharge, changes in size or contour, or abnormal cyclic discomfort.  Gastrointestinal: negative for anorexia, dysphagia, nausea, heartburn/reflux, abdominal pain, hemorrhoids, constipation or diarrhea.  Genitourinary: negative for dysuria, frequency, hesitancy, incontinence, abnormal vaginal discharge/bleeding.  Musculoskeletal: negative for back/joint pain, myalgia.   Neuro: negative for migraine headaches, involuntary movements or tremor  Psychiatric: negative for excessive alcohol use, illegal drug use, sleep problems, anxiety, depression.  Hematologic/Lymphatic/Immunologic: negative for anemia, unusual bruising, swollen glands.  Endocrine: negative for temperature intolerance, polydipsia, polyuria.     PHYSICAL EXAMINATION:  /66 (BP Location: Left arm)   Pulse 74   Temp 36.3 °C (97.4 °F) (Temporal)   Ht 1.664 m (5' 5.5\")   Wt 65.8 kg (145 lb 1 oz)   SpO2 98%   Body mass index is 23.77 kg/m².  Wt Readings from Last 4 Encounters:   06/26/19 65.8 kg (145 lb 1 oz)   06/04/19 66.5 kg (146 lb 9.7 oz)   08/16/18 67.9 kg (149 lb 11.1 oz)   12/12/17 68.4 kg (150 lb 12.8 oz)       General appearance:healthy, well developed, well nourished, well-groomed  Psych: alert, no distress, cooperative. Eye contact good, speech goal directed. Affect calm.   Eyes: conjunctivae and sclerae normal, lids and lashes normal, EOMI, PERRLA  ENT: Ears: external ears normal to inspection, canals clear. TMs pearly gray with normal light reflex and anatomic landmarks, Nose/Sinuses: nose shows " no deformity, asymmetry, or inflammation, nasal mucosa normal, no sinus tenderness,   Oropharynx: lips normal without lesions, buccal mucosa normal, gums healthy, teeth intact, non-carious, palate normal, tongue midline and normal  Neck: no asymmetry, masses, adenopathy. Thyroid normal to palpation, carotids without bruits, no jugular venous distention  Lungs: clear to auscultation with good excursion, Normal respiratory rate. Chest symmetric no chest wall tenderness.  Cardiovascular: Regular rate and rhythm, no murmur.  No peripheral edema  Breasts examined seated and supine. No skin changes, peau d'orange or nipple retraction. No axillary or supraclavicular adenopathy. No dominant, discrete, fixed, or suspicious masses found.    Abdomen:  Soft, non-tender, no masses, HSM or palpable renal abnormalities. Bowel sounds normal, no bruits heard. No CVAT.  Musculoskeletal: no evidence of joint effusion, ROM of all joints is normal, no crepitation detected, strength grossly normal UE and LE, proximal and distal motor groups. No deformities present  Lymphatic: None significantly enlarged supraclavicular, axillary, or inguinal  Skin: color normal, vascularity normal, no rashes or suspicious lesions, no evidence of bleeding or bruising  Neuro: speech normal, mental status intact, gait grossly normal, muscle tone normal.    LABS    Labs are reviewed and discussed with a patient  Lab Results   Component Value Date/Time    CHOLSTRLTOT 247 (H) 06/11/2019 07:03 AM     (H) 06/11/2019 07:03 AM    HDL 69 06/11/2019 07:03 AM    TRIGLYCERIDE 77 06/11/2019 07:03 AM       Lab Results   Component Value Date/Time    SODIUM 141 06/11/2019 07:03 AM    POTASSIUM 4.2 06/11/2019 07:03 AM    CHLORIDE 106 06/11/2019 07:03 AM    CO2 28 06/11/2019 07:03 AM    GLUCOSE 85 06/11/2019 07:03 AM    BUN 17 06/11/2019 07:03 AM    CREATININE 0.76 06/11/2019 07:03 AM     Lab Results   Component Value Date/Time    ALKPHOSPHAT 36 06/11/2019 07:03 AM     ASTSGOT 21 06/11/2019 07:03 AM    ALTSGPT 17 06/11/2019 07:03 AM    TBILIRUBIN 0.5 06/11/2019 07:03 AM      Lab Results   Component Value Date/Time    WBC 5.1 03/14/2018 07:09 AM    RBC 4.95 03/14/2018 07:09 AM    HEMOGLOBIN 13.9 03/14/2018 07:09 AM    HEMATOCRIT 42.3 03/14/2018 07:09 AM    MCV 85.5 03/14/2018 07:09 AM    MCH 28.1 03/14/2018 07:09 AM    MCHC 32.9 (L) 03/14/2018 07:09 AM    MPV 10.7 03/14/2018 07:09 AM    NEUTSPOLYS 50.10 03/14/2018 07:09 AM    LYMPHOCYTES 37.20 03/14/2018 07:09 AM    MONOCYTES 9.00 03/14/2018 07:09 AM    EOSINOPHILS 2.30 03/14/2018 07:09 AM    BASOPHILS 1.20 03/14/2018 07:09 AM      ASSESSMENT/PLAN    1. Annual physical exam  Reviewed PMH, PSH, FH, SH, ALL, MEDS, HCM/IMM.   Advised healthy habits, diet, exercise.    2. Health care maintenance  Per HPI    3. Encounter for screening mammogram for malignant neoplasm of breast  Due in 8/2019.  - MA-SCREEN MAMMO W/CAD-BILAT; Future    4. Menopause  - DS-BONE DENSITY STUDY (DEXA); Future    5. Hypercholesterolemia  Started: omega 3.  Will start niacian  mg BID  - Comp Metabolic Panel; Future  - Lipid Profile; Future  - Comp Metabolic Panel; Future  - Lipid Profile; Future    6. Synovial cyst of left popliteal space  No significant sx    7. Chronic lumbosacral pain  Improved, continue activity as tolerated and current tx.    Smoking Counseling: Nonsmoker    Next office visit is due in  1 year    All questions are answered.     Please note that this dictation was created using voice recognition software. Despite all efforts, some minor grammar mistakes are possible.

## 2019-07-01 ENCOUNTER — APPOINTMENT (OUTPATIENT)
Dept: PHYSICAL THERAPY | Facility: MEDICAL CENTER | Age: 59
End: 2019-07-01
Attending: INTERNAL MEDICINE
Payer: COMMERCIAL

## 2019-07-01 ENCOUNTER — HOSPITAL ENCOUNTER (OUTPATIENT)
Dept: RADIOLOGY | Facility: MEDICAL CENTER | Age: 59
End: 2019-07-01
Attending: INTERNAL MEDICINE
Payer: COMMERCIAL

## 2019-07-01 DIAGNOSIS — Z78.0 MENOPAUSE: ICD-10-CM

## 2019-07-01 PROCEDURE — 77080 DXA BONE DENSITY AXIAL: CPT

## 2019-07-19 ENCOUNTER — OFFICE VISIT (OUTPATIENT)
Dept: MEDICAL GROUP | Facility: MEDICAL CENTER | Age: 59
End: 2019-07-19
Payer: COMMERCIAL

## 2019-07-19 VITALS
HEIGHT: 65 IN | BODY MASS INDEX: 24.65 KG/M2 | DIASTOLIC BLOOD PRESSURE: 60 MMHG | SYSTOLIC BLOOD PRESSURE: 100 MMHG | TEMPERATURE: 98.1 F | WEIGHT: 147.93 LBS | HEART RATE: 70 BPM | OXYGEN SATURATION: 94 %
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
## 2019-07-19 DIAGNOSIS — B00.1 COLD SORE: ICD-10-CM

## 2019-07-19 PROCEDURE — 99214 OFFICE O/P EST MOD 30 MIN: CPT | Mod: 25 | Performed by: INTERNAL MEDICINE

## 2019-07-19 RX ORDER — VALACYCLOVIR HYDROCHLORIDE 500 MG/1
500 TABLET, FILM COATED ORAL 2 TIMES DAILY
Qty: 40 TAB | Refills: 0 | Status: SHIPPED | OUTPATIENT
Start: 2019-07-19 | End: 2019-07-19 | Stop reason: SDUPTHER

## 2019-07-19 RX ORDER — VALACYCLOVIR HYDROCHLORIDE 500 MG/1
500 TABLET, FILM COATED ORAL 2 TIMES DAILY
Qty: 40 TAB | Refills: 0 | Status: SHIPPED | OUTPATIENT
Start: 2019-07-19 | End: 2020-04-28

## 2019-07-19 RX ORDER — TRIAMCINOLONE ACETONIDE 40 MG/ML
40 INJECTION, SUSPENSION INTRA-ARTICULAR; INTRAMUSCULAR ONCE
Status: COMPLETED | OUTPATIENT
Start: 2019-07-19 | End: 2019-07-19

## 2019-07-19 RX ADMIN — TRIAMCINOLONE ACETONIDE 40 MG: 40 INJECTION, SUSPENSION INTRA-ARTICULAR; INTRAMUSCULAR at 14:27

## 2019-07-19 NOTE — PROGRESS NOTES
CHIEF COMPLAINT  Insect bite    HPI  Rosemarie Falk is a 59 y.o. female who presents today for the following     Insect bite  Occurrence: ~ 36 hrs ago  The patient did not feel insect bite, but developed fast and sudden development of redness, swelling, feeling of tightness in the bite area in the right upper arm, and bruise/hematoma;  -At the same time she noticed insect bites, seems on 2 spots in the same area.  She applied ice packs that helped, so it has shrunk slightly.    She has not have:  -Skin rash in other areas of the body  -Itching  -Lip, tongue, or throat swelling  -Wheezing, cough  -Nausea, vomiting, abdominal pain  -Dizziness, lightheadedness    Cold sore on the lower lip  At the same time, she developed cold sores on lobar week, with burning and pain, blister development.    Reviewed PMH, PSH, FH, SH, ALL, HCM/IMM, no changes  Reviewed MEDS, no changes    Patient Active Problem List    Diagnosis Date Noted   • Chronic lumbosacral pain 06/26/2019   • Hypercholesterolemia 02/17/2019   • Synovial cyst of left popliteal space 08/16/2018   • Health care maintenance 12/12/2017     CURRENT MEDICATIONS  Current Outpatient Prescriptions   Medication Sig Dispense Refill   • multivitamin (THERAGRAN) Tab Take 1 Tab by mouth every day.     • tizanidine (ZANAFLEX) 4 MG Tab Take 1 Tab by mouth every 6 hours as needed. (Patient not taking: Reported on 6/26/2019) 30 Tab 3   • meloxicam (MOBIC) 15 MG tablet Take 1 Tab by mouth every day. (Patient not taking: Reported on 7/19/2019) 60 Tab 0     Current Facility-Administered Medications   Medication Dose Route Frequency Provider Last Rate Last Dose   • triamcinolone acetonide (KENALOG-40) injection 40 mg  40 mg Intramuscular Once Baron Kaminski M.D.         ALLERGIES  Allergies: Patient has no known allergies.  PAST MEDICAL HISTORY  Past Medical History:   Diagnosis Date   • Hypercholesterolemia      SURGICAL HISTORY  She  has no past surgical history on  "file.  SOCIAL HISTORY  Social History   Substance Use Topics   • Smoking status: Never Smoker   • Smokeless tobacco: Never Used   • Alcohol use No     Social History     Social History Narrative   • No narrative on file     FAMILY HISTORY  No family history on file.  No family status information on file.     ROS   Constitutional: As above.  HENT: As above.  Eyes: Negative for blurred vision.   Respiratory: Negative for cough, shortness of breath.  Cardiovascular: Negative for chest pain, palpitations.   Gastrointestinal: Negative for heartburn, nausea, abdominal pain.   Musculoskeletal: Negative for muscle aches.  Skin: As above.  Neuro: Negative for dizziness, weakness and headaches.   Endo/Heme/Allergies: Does not bruise/bleed easily.   Psychiatric/Behavioral: Negative for depression.    PHYSICAL EXAM   /60   Pulse 70   Temp 36.7 °C (98.1 °F) (Temporal)   Ht 1.651 m (5' 5\")   Wt 67.1 kg (147 lb 14.9 oz)   SpO2 94%  Body mass index is 24.62 kg/m².  General:  NAD, well appearing  HEENT:   NC/AT, PERRLA, EOMI, TMs are clear.  Cold sore on lower lip.  Oropharyngeal mucosa is pink,  without lesions;  no cervical / supraclavicular  lymphadenopathy, no thyromegaly.    Cardiovascular: RRR.   No m/r/g.       Lungs:   CTAB, no w/r/r, no respiratory distress.  Abdomen: Soft, NT/ND; no hepatosplenomegaly.  Extremities:  2+ DP and radial pulses bilaterally.  No c/c/e.   Skin:  Warm, dry.    Right upper arm: Large bruise, redness, mild tenderness to palpation over the whole biceps.  Neurologic: Alert & oriented x 3. CN II-XII grossly intact. No focal deficits.  Psychiatric:  Affect normal, mood normal, judgment normal.    Labs     Labs are reviewed and discussed with a patient  Lab Results   Component Value Date/Time    CHOLSTRLTOT 247 (H) 06/11/2019 07:03 AM     (H) 06/11/2019 07:03 AM    HDL 69 06/11/2019 07:03 AM    TRIGLYCERIDE 77 06/11/2019 07:03 AM       Lab Results   Component Value Date/Time    SODIUM " 141 06/11/2019 07:03 AM    POTASSIUM 4.2 06/11/2019 07:03 AM    CHLORIDE 106 06/11/2019 07:03 AM    CO2 28 06/11/2019 07:03 AM    GLUCOSE 85 06/11/2019 07:03 AM    BUN 17 06/11/2019 07:03 AM    CREATININE 0.76 06/11/2019 07:03 AM     Lab Results   Component Value Date/Time    ALKPHOSPHAT 36 06/11/2019 07:03 AM    ASTSGOT 21 06/11/2019 07:03 AM    ALTSGPT 17 06/11/2019 07:03 AM    TBILIRUBIN 0.5 06/11/2019 07:03 AM      No results found for: HBA1C  No results found for: TSH  No results found for: FREET4    Lab Results   Component Value Date/Time    WBC 5.1 03/14/2018 07:09 AM    RBC 4.95 03/14/2018 07:09 AM    HEMOGLOBIN 13.9 03/14/2018 07:09 AM    HEMATOCRIT 42.3 03/14/2018 07:09 AM    MCV 85.5 03/14/2018 07:09 AM    MCH 28.1 03/14/2018 07:09 AM    MCHC 32.9 (L) 03/14/2018 07:09 AM    MPV 10.7 03/14/2018 07:09 AM    NEUTSPOLYS 50.10 03/14/2018 07:09 AM    LYMPHOCYTES 37.20 03/14/2018 07:09 AM    MONOCYTES 9.00 03/14/2018 07:09 AM    EOSINOPHILS 2.30 03/14/2018 07:09 AM    BASOPHILS 1.20 03/14/2018 07:09 AM      Imaging     None    Assessment and Plan     oRsemarie Falk is a 59 y.o. female    1. Insect bite, initial encounter  Advised to take cetirizine 10 mg daily, along with Benadryl 25 mg twice daily for the next few days  - triamcinolone acetonide (KENALOG-40) injection 40 mg; 1 mL by Intramuscular route Once.    2. Cold sore  Start:  - valACYclovir (VALTREX) 500 MG Tab; Take 1 Tab by mouth 2 times a day.  Dispense: 40 Tab; Refill: 0    Counseling:   - Smoking:  Nonsmoker    Followup: Return if symptoms worsen or fail to improve.    All questions are answered.    Please note that this dictation was created using voice recognition software, and that there might be errors of kenney and possibly content.

## 2019-08-21 ENCOUNTER — HOSPITAL ENCOUNTER (OUTPATIENT)
Dept: RADIOLOGY | Facility: MEDICAL CENTER | Age: 59
End: 2019-08-21
Attending: INTERNAL MEDICINE
Payer: COMMERCIAL

## 2019-08-21 DIAGNOSIS — Z12.31 SCREENING MAMMOGRAM, ENCOUNTER FOR: ICD-10-CM

## 2019-08-21 PROCEDURE — 77063 BREAST TOMOSYNTHESIS BI: CPT

## 2019-08-23 DIAGNOSIS — Z80.3 FAMILY HISTORY OF BREAST CANCER: ICD-10-CM

## 2019-09-09 ENCOUNTER — OFFICE VISIT (OUTPATIENT)
Dept: HEMATOLOGY ONCOLOGY | Facility: MEDICAL CENTER | Age: 59
End: 2019-09-09
Payer: COMMERCIAL

## 2019-09-09 ENCOUNTER — NON-PROVIDER VISIT (OUTPATIENT)
Dept: HEMATOLOGY ONCOLOGY | Facility: MEDICAL CENTER | Age: 59
End: 2019-09-09
Payer: COMMERCIAL

## 2019-09-09 VITALS
RESPIRATION RATE: 12 BRPM | WEIGHT: 144 LBS | SYSTOLIC BLOOD PRESSURE: 108 MMHG | HEIGHT: 64 IN | HEART RATE: 97 BPM | BODY MASS INDEX: 24.59 KG/M2 | TEMPERATURE: 98.6 F | OXYGEN SATURATION: 98 % | DIASTOLIC BLOOD PRESSURE: 60 MMHG

## 2019-09-09 VITALS
DIASTOLIC BLOOD PRESSURE: 60 MMHG | RESPIRATION RATE: 12 BRPM | HEART RATE: 97 BPM | WEIGHT: 144.29 LBS | HEIGHT: 65 IN | BODY MASS INDEX: 24.04 KG/M2 | OXYGEN SATURATION: 98 % | TEMPERATURE: 98.6 F | SYSTOLIC BLOOD PRESSURE: 108 MMHG

## 2019-09-09 DIAGNOSIS — Z80.8 FAMILY HISTORY OF MELANOMA: ICD-10-CM

## 2019-09-09 DIAGNOSIS — Z80.8 FAMILY HISTORY OF OTHER SPECIFIED MALIGNANT NEOPLASM: ICD-10-CM

## 2019-09-09 DIAGNOSIS — Z80.3 FAMILY HISTORY OF MALIGNANT NEOPLASM OF BREAST: ICD-10-CM

## 2019-09-09 DIAGNOSIS — Z80.3 FAMILY HISTORY OF BREAST CANCER: ICD-10-CM

## 2019-09-09 PROCEDURE — 99202 OFFICE O/P NEW SF 15 MIN: CPT | Performed by: MEDICAL GENETICS

## 2019-09-09 PROCEDURE — 36415 COLL VENOUS BLD VENIPUNCTURE: CPT | Performed by: MEDICAL GENETICS

## 2019-09-09 ASSESSMENT — PAIN SCALES - GENERAL
PAINLEVEL: NO PAIN
PAINLEVEL: NO PAIN

## 2019-09-09 NOTE — PROGRESS NOTES
GENETIC RISK ASSESSMENT    Rosemarie Falk is a 59 y.o. year old patient who was referred by \[facility]\"" for cancer genetic risk assessment.    Chief Complaint: Patient with family history of breast, ,melanoma     HPI: The patient does not carry a cancer diagnosis  Review of personal and family histories suggested that a cancer genetic risk assessment was in order.    Review of Systems (ROS):  Constitutional normal  Eyes noraml  ENT noraml   Respiratory normal  Cardiovascular normal  Gastrointestinal normal  Genitourinary normal  Musculoskeletal normal  Skin normal  Neurological greg  Endocrine normal  Psychiatric normal  Hematologic/lymphatic normal  Allergic/Immunologic none  All other systems reviewed and are negative.    History (Past/Family)  Family History:   No family history on file.   3 generation pedigree built   Yes   [place] empiric risk calculation No   [place] II empiric risk calculation  No    Social History:       Social History     Socioeconomic History   • Marital status:      Spouse name: Not on file   • Number of children: Not on file   • Years of education: Not on file   • Highest education level: Not on file   Occupational History   • Not on file   Social Needs   • Financial resource strain: Not on file   • Food insecurity:     Worry: Not on file     Inability: Not on file   • Transportation needs:     Medical: Not on file     Non-medical: Not on file   Tobacco Use   • Smoking status: Never Smoker   • Smokeless tobacco: Never Used   Substance and Sexual Activity   • Alcohol use: No   • Drug use: No   • Sexual activity: Yes     Partners: Male   Lifestyle   • Physical activity:     Days per week: Not on file     Minutes per session: Not on file   • Stress: Not on file   Relationships   • Social connections:     Talks on phone: Not on file     Gets together: Not on file     Attends Restorationist service: Not on file     Active member of club or organization: Not on file     Attends meetings  "of clubs or organizations: Not on file     Relationship status: Not on file   • Intimate partner violence:     Fear of current or ex partner: Not on file     Emotionally abused: Not on file     Physically abused: Not on file     Forced sexual activity: Not on file   Other Topics Concern   • Not on file   Social History Narrative   • Not on file       Patient Past History:  Past Medical History:   Diagnosis Date   • Hypercholesterolemia            NCCN Testing Criteria Met                            Yes    Physical Exam  Constitutional    Ht 1.65 m (5' 4.96\")   Wt 65.5 kg (144 lb 4.7 oz)   BMI 24.04 kg/m²         General appearance: normla   Head Circumference:   ([place])  Eyes    Conjunctiva: clear   Pupils: reactive     ENMT    External inspection: normal   Assessment of Hearing: normal   Lips/cheeks/gums: no lesions  Neck   External exam: normal   Thyroid: no masses  Respiratory   Auscultation: clear    Cardiovascular   Auscultation: RRR   Edema : none  Chest   Breasts (exam): not done   Palpation:   GI   External exam and palpation: normal     Pelvic (female): not edoen   External exam (male):   Lymphatic   Palpation in 2 areas: normal    Musculoskeletal   Gait: normal   Digits and Nails: no lesions  Skin   Inspection: normla   Palpation: no masses                  Fibrofolliculoma: absent                  Trichodiscoma: absent                   Akrochordon: absent                   CAfe-au-lait:  absent                  Hypopigmentation: absent                  Mucosal freckling:  absent  Neurologic   Cranial nerves: intact   DTR’s: 2+       Assessment and Plan:  Patient with family history of breast cancer (mother age 44, ) and brother (melanoma) and GF  (mat: unknown)    I spent a total of 40 minutes of face to face time with >50% of time spent in counseling and coordination of care discussing matters stated in above note.    Aj panel ordered      Joaquín Villarreal M.D.  "

## 2019-09-09 NOTE — NON-PROVIDER
Rosemarie Falk is a 59 y.o. female here for a non-provider visit for: Lab Draws  on 9/9/2019 at 3:16 PM    Procedure Performed: Venipuncture     Anatomical site: Right Antecubital Area (AC)    Equipment used: 21g vacutainer    Labs drawn: eASIC (two lavender EDTA tubes)    Ordering Provider: {Dr. Joaquín Villarreal    Juan By: Eden Serna, Med Ass't

## 2019-09-09 NOTE — PROGRESS NOTES
"  Non face to face prolonged services of clinical data and chart information reviewed for a total time of 35 performed on 9/8 for Rosemarie Mora Falk    Reviewed were:    Referral    Previous encounters    Medications    Imaging    Previous procedures    Other Orders    Letters    Notes    Media    Miscellaneous reports    Patient summaries        Counseling, testing information and materials prepared    \"I spent 35 minutes  from 1425 to 1500 reviewing past records, prior to visit pertaining to genetic risk.\"     Joaquín Villarreal M.D.  edited  "

## 2019-09-23 ENCOUNTER — TELEPHONE (OUTPATIENT)
Dept: HEMATOLOGY ONCOLOGY | Facility: MEDICAL CENTER | Age: 59
End: 2019-09-23

## 2019-11-22 ENCOUNTER — HOSPITAL ENCOUNTER (OUTPATIENT)
Dept: LAB | Facility: MEDICAL CENTER | Age: 59
End: 2019-11-22
Attending: INTERNAL MEDICINE
Payer: COMMERCIAL

## 2019-11-22 DIAGNOSIS — E78.00 HYPERCHOLESTEROLEMIA: ICD-10-CM

## 2019-11-22 LAB
ALBUMIN SERPL BCP-MCNC: 4.2 G/DL (ref 3.2–4.9)
ALBUMIN/GLOB SERPL: 1.6 G/DL
ALP SERPL-CCNC: 39 U/L (ref 30–99)
ALT SERPL-CCNC: 14 U/L (ref 2–50)
ANION GAP SERPL CALC-SCNC: 6 MMOL/L (ref 0–11.9)
AST SERPL-CCNC: 18 U/L (ref 12–45)
BILIRUB SERPL-MCNC: 0.5 MG/DL (ref 0.1–1.5)
BUN SERPL-MCNC: 11 MG/DL (ref 8–22)
CALCIUM SERPL-MCNC: 9.1 MG/DL (ref 8.5–10.5)
CHLORIDE SERPL-SCNC: 105 MMOL/L (ref 96–112)
CHOLEST SERPL-MCNC: 228 MG/DL (ref 100–199)
CO2 SERPL-SCNC: 29 MMOL/L (ref 20–33)
CREAT SERPL-MCNC: 0.78 MG/DL (ref 0.5–1.4)
FASTING STATUS PATIENT QL REPORTED: NORMAL
GLOBULIN SER CALC-MCNC: 2.7 G/DL (ref 1.9–3.5)
GLUCOSE SERPL-MCNC: 82 MG/DL (ref 65–99)
HDLC SERPL-MCNC: 65 MG/DL
LDLC SERPL CALC-MCNC: 151 MG/DL
POTASSIUM SERPL-SCNC: 4.1 MMOL/L (ref 3.6–5.5)
PROT SERPL-MCNC: 6.9 G/DL (ref 6–8.2)
SODIUM SERPL-SCNC: 140 MMOL/L (ref 135–145)
TRIGL SERPL-MCNC: 59 MG/DL (ref 0–149)

## 2019-11-22 PROCEDURE — 36415 COLL VENOUS BLD VENIPUNCTURE: CPT

## 2019-11-22 PROCEDURE — 80061 LIPID PANEL: CPT

## 2019-11-22 PROCEDURE — 80053 COMPREHEN METABOLIC PANEL: CPT

## 2020-04-16 ENCOUNTER — TELEPHONE (OUTPATIENT)
Dept: MEDICAL GROUP | Facility: MEDICAL CENTER | Age: 60
End: 2020-04-16

## 2020-04-16 DIAGNOSIS — E78.00 HYPERCHOLESTEROLEMIA: ICD-10-CM

## 2020-04-16 DIAGNOSIS — Z00.00 HEALTH CARE MAINTENANCE: ICD-10-CM

## 2020-04-16 NOTE — TELEPHONE ENCOUNTER
VOICEMAIL  1. Caller Name: Rosemarie Falk                        Call Back Number: [phone]      2. Message: Left VM for pt to cb, Received notification from spouse about switching pcp from [facility] to [facility].    3. Patient approves office to leave a detailed voicemail/MyChart message: yes

## 2020-04-21 ENCOUNTER — HOSPITAL ENCOUNTER (OUTPATIENT)
Dept: LAB | Facility: MEDICAL CENTER | Age: 60
End: 2020-04-21
Attending: INTERNAL MEDICINE
Payer: COMMERCIAL

## 2020-04-21 DIAGNOSIS — E78.00 HYPERCHOLESTEROLEMIA: ICD-10-CM

## 2020-04-21 DIAGNOSIS — Z00.00 HEALTH CARE MAINTENANCE: ICD-10-CM

## 2020-04-21 LAB
25(OH)D3 SERPL-MCNC: 70 NG/ML (ref 30–100)
ALBUMIN SERPL BCP-MCNC: 4.7 G/DL (ref 3.2–4.9)
ALBUMIN/GLOB SERPL: 1.8 G/DL
ALP SERPL-CCNC: 41 U/L (ref 30–99)
ALT SERPL-CCNC: 13 U/L (ref 2–50)
ANION GAP SERPL CALC-SCNC: 9 MMOL/L (ref 7–16)
AST SERPL-CCNC: 18 U/L (ref 12–45)
BASOPHILS # BLD AUTO: 0.9 % (ref 0–1.8)
BASOPHILS # BLD: 0.04 K/UL (ref 0–0.12)
BILIRUB SERPL-MCNC: 0.6 MG/DL (ref 0.1–1.5)
BUN SERPL-MCNC: 14 MG/DL (ref 8–22)
CALCIUM SERPL-MCNC: 9.6 MG/DL (ref 8.4–10.2)
CHLORIDE SERPL-SCNC: 102 MMOL/L (ref 96–112)
CHOLEST SERPL-MCNC: 226 MG/DL (ref 100–199)
CO2 SERPL-SCNC: 28 MMOL/L (ref 20–33)
CREAT SERPL-MCNC: 0.73 MG/DL (ref 0.5–1.4)
EOSINOPHIL # BLD AUTO: 0.13 K/UL (ref 0–0.51)
EOSINOPHIL NFR BLD: 3 % (ref 0–6.9)
ERYTHROCYTE [DISTWIDTH] IN BLOOD BY AUTOMATED COUNT: 40 FL (ref 35.9–50)
FASTING STATUS PATIENT QL REPORTED: NORMAL
GLOBULIN SER CALC-MCNC: 2.6 G/DL (ref 1.9–3.5)
GLUCOSE SERPL-MCNC: 95 MG/DL (ref 65–99)
HCT VFR BLD AUTO: 41.9 % (ref 37–47)
HDLC SERPL-MCNC: 72 MG/DL
HGB BLD-MCNC: 13.5 G/DL (ref 12–16)
IMM GRANULOCYTES # BLD AUTO: 0.01 K/UL (ref 0–0.11)
IMM GRANULOCYTES NFR BLD AUTO: 0.2 % (ref 0–0.9)
LDLC SERPL CALC-MCNC: 139 MG/DL
LYMPHOCYTES # BLD AUTO: 1.6 K/UL (ref 1–4.8)
LYMPHOCYTES NFR BLD: 36.9 % (ref 22–41)
MCH RBC QN AUTO: 27.7 PG (ref 27–33)
MCHC RBC AUTO-ENTMCNC: 32.2 G/DL (ref 33.6–35)
MCV RBC AUTO: 85.9 FL (ref 81.4–97.8)
MONOCYTES # BLD AUTO: 0.43 K/UL (ref 0–0.85)
MONOCYTES NFR BLD AUTO: 9.9 % (ref 0–13.4)
NEUTROPHILS # BLD AUTO: 2.13 K/UL (ref 2–7.15)
NEUTROPHILS NFR BLD: 49.1 % (ref 44–72)
NRBC # BLD AUTO: 0 K/UL
NRBC BLD-RTO: 0 /100 WBC
PLATELET # BLD AUTO: 283 K/UL (ref 164–446)
PMV BLD AUTO: 10.1 FL (ref 9–12.9)
POTASSIUM SERPL-SCNC: 4.6 MMOL/L (ref 3.6–5.5)
PROT SERPL-MCNC: 7.3 G/DL (ref 6–8.2)
RBC # BLD AUTO: 4.88 M/UL (ref 4.2–5.4)
SODIUM SERPL-SCNC: 139 MMOL/L (ref 135–145)
T4 FREE SERPL-MCNC: 0.99 NG/DL (ref 0.58–1.64)
TRIGL SERPL-MCNC: 75 MG/DL (ref 0–149)
TSH SERPL DL<=0.005 MIU/L-ACNC: 2.81 UIU/ML (ref 0.38–5.33)
WBC # BLD AUTO: 4.3 K/UL (ref 4.8–10.8)

## 2020-04-21 PROCEDURE — 82306 VITAMIN D 25 HYDROXY: CPT

## 2020-04-21 PROCEDURE — 84443 ASSAY THYROID STIM HORMONE: CPT

## 2020-04-21 PROCEDURE — 36415 COLL VENOUS BLD VENIPUNCTURE: CPT

## 2020-04-21 PROCEDURE — 80061 LIPID PANEL: CPT

## 2020-04-21 PROCEDURE — 80053 COMPREHEN METABOLIC PANEL: CPT

## 2020-04-21 PROCEDURE — 84439 ASSAY OF FREE THYROXINE: CPT

## 2020-04-21 PROCEDURE — 85025 COMPLETE CBC W/AUTO DIFF WBC: CPT

## 2020-04-28 ENCOUNTER — OFFICE VISIT (OUTPATIENT)
Dept: MEDICAL GROUP | Facility: MEDICAL CENTER | Age: 60
End: 2020-04-28
Payer: COMMERCIAL

## 2020-04-28 VITALS
WEIGHT: 140.2 LBS | DIASTOLIC BLOOD PRESSURE: 60 MMHG | TEMPERATURE: 98.5 F | OXYGEN SATURATION: 92 % | HEART RATE: 78 BPM | SYSTOLIC BLOOD PRESSURE: 100 MMHG | BODY MASS INDEX: 23.93 KG/M2 | RESPIRATION RATE: 16 BRPM | HEIGHT: 64 IN

## 2020-04-28 DIAGNOSIS — D72.819 LEUKOPENIA, UNSPECIFIED TYPE: ICD-10-CM

## 2020-04-28 DIAGNOSIS — Z12.31 ENCOUNTER FOR SCREENING MAMMOGRAM FOR MALIGNANT NEOPLASM OF BREAST: ICD-10-CM

## 2020-04-28 DIAGNOSIS — Z80.8 FHX: MELANOMA: ICD-10-CM

## 2020-04-28 DIAGNOSIS — F43.9 STRESS: ICD-10-CM

## 2020-04-28 DIAGNOSIS — H54.40 BLINDNESS OF ONE EYE: ICD-10-CM

## 2020-04-28 PROBLEM — H53.001 RIGHT AMBLYOPIA: Status: ACTIVE | Noted: 2017-06-14

## 2020-04-28 PROCEDURE — 99214 OFFICE O/P EST MOD 30 MIN: CPT | Performed by: INTERNAL MEDICINE

## 2020-04-28 ASSESSMENT — FIBROSIS 4 INDEX: FIB4 SCORE: 1.08

## 2020-04-28 NOTE — PROGRESS NOTES
"CC:  Diagnoses of Blindness of one eye, Stress, Encounter for screening mammogram for malignant neoplasm of breast, FHx: melanoma, and Leukopenia, unspecified type were pertinent to this visit.    HISTORY OF THE PRESENT ILLNESS: Patient is a 60 y.o. female. This pleasant patient is here today to establish care.    Recent labs from 4/21/2020 reviewed.  Normal thyroid, vitamin D at 70, normal GFR, and CMP.  WBC of 4.3 with normal differential.  Total cholesterol 226, triglycerides 75, HDL 72, .  10-year ASCVD risk or 2.4%, patient is at very low risk.  Denies any cardiopulmonary symptoms at rest or exertion, and no strokelike symptoms.  Previously exercised regularly and healthy diet.  With the pandemic she is feeling \"blah.  \"Does not have any prior history of depression.  She says not knowing when the pandemic will be over is leaving her feeling very unmotivated to exercise especially the past 2 weeks.  Even bought a Peloton and used once since January. Gaining some small degree of weight secondarily.     She is concerned about some cysts on her head which have been reviewed by prior PCP and thought to be benign.  Recently had genetic testing for family history of cancer, she says her results returned normal.  Her brother did have melanoma.  Patient is interested in establishing with a local dermatologist for preventative health.    Blind in right eye, had congenital cataract that was not repaired until age 15.  She does have an ophthalmologist she sees regularly.    Mammogram is still pending given the pandemic, no acute breast concerns.    Allergies: Patient has no known allergies.    No current [employer]-ordered outpatient medications on file.     No current [facility]-ordered facility-administered medications on file.        Past Medical History:   Diagnosis Date   • Hypercholesterolemia        Past Surgical History:   Procedure Laterality Date   • TONSILLECTOMY     • TUBAL LIGATION         Social History " "    Tobacco Use   • Smoking status: Never Smoker   • Smokeless tobacco: Never Used   Substance Use Topics   • Alcohol use: No   • Drug use: No       Social History     Social History Narrative   • Not on file       Family History   Problem Relation Age of Onset   • Cancer Mother         breast cancer age 45   • No Known Problems Father    • Cancer Brother         melanoma       ROS:     - Constitutional: Negative for fever, chills, unexpected weight change, night sweats    - Eyes:   Negative for acute change    - ENT:  Negative forear pain, ear discharge, rhinorrhea, sinus congestion, sore throat     - Respiratory: Negative for cough, sputum production, chest congestion, dyspnea, wheezing, and crackles.      - Cardiovascular: Negative for chest pain, palpitations, orthopnea, and bilateral lower extremity edema.     - Gastrointestinal: Negative for heartburn, nausea, vomiting, abdominal pain, hematochezia, melena, diarrhea, constipation, and greasy/foul-smelling stools.     - Genitourinary: Negative for dysuria    - Musculoskeletal: Negative for myalgias, back pain, and joint pain.     - Skin: scalp see hpi    - Neurological: Negative for vertigo    - Endo:Negative for polyuria, heat/cold intolerance, excessive thirst    - Hem/lymphatic: Negative for swollen glands    -Allergic/immun: Negative for allergic rhinitis    - Psychiatric/Behavioral: Negative for depression, suicidal/homicidal ideation and memory loss.      Exam: /60 (BP Location: Left arm, Patient Position: Sitting, BP Cuff Size: Adult)   Pulse 78   Temp 36.9 °C (98.5 °F) (Temporal)   Resp 16   Ht 1.626 m (5' 4\")   Wt 63.6 kg (140 lb 3.2 oz)   SpO2 92%  Body mass index is 24.07 kg/m².    General: Normal appearing. No distress.  EYES: Conjunctiva clear lids without ptosis, pupils equal  EARS: Normal shape and contour   NOSE, THROAT: nasal mucosa benign. oropharynx is without erythema, edema or exudates.   Neck: Supple without LAD. Thyroid is not " enlarged.  Pulmonary: Clear to ausculation.  Normal effort. No rales or wheezing.  Cardiovascular: Regular rate and rhythm without significant murmur.   Abdomen: Soft, nontender, nondistended. Normal bowel sounds.  Neurologic: Cranial nerves grossly nonfocal  Lymph: No cervical, supraclavicular nodes palpable  Skin: Warm and dry.  No obvious lesions. Scalp small cyst  Musculoskeletal: Normal gait. No extremity cyanosis, clubbing, or edema.  Psych: Normal mood and affect. Alert and oriented x3. Judgment and insight is normal.      Assessment/Plan  1. Blindness of one eye  Chronic and stable since birth.  Regularly followed by ophthalmologist.    2. Stress  No prior history of depression or mood disorder.  It is understandable patient is feeling stressed and less motivated with the events of the pandemic.  She will use her Peloton bike today, and start regular yoga, meditation.  If needed, can place referral to counseling.  At this time we both agree she does not need to start medication.  - REFERRAL FOR ACUPUNCTURE    3. Encounter for screening mammogram for malignant neoplasm of breast  Due for routine screening, currently asymptomatic.  - MA-SCREENING MAMMO BILAT W/TOMOSYNTHESIS W/CAD; Future    4. FHx: melanoma  No current acute skin concerns but with family history melanoma she would benefit from routine skin evaluations.  The finding of her scalp looks like a benign cyst.  - REFERRAL TO DERMATOLOGY    5. Leukopenia, unspecified type  Normal differential and no constitutional symptoms, likely benign finding but will plan to repeat CBC in the next 4 to 6 months for trend.  - CBC WITH DIFFERENTIAL; Future        Patient prefers annual follow-up and this is reasonable, sooner if needed      Please note that this dictation was created using voice recognition software. I have made every reasonable attempt to correct obvious errors, but I expect that there are errors of grammar and possibly content that I did not  discover before finalizing the note.

## 2020-05-15 ENCOUNTER — APPOINTMENT (OUTPATIENT)
Dept: MEDICAL GROUP | Facility: IMAGING CENTER | Age: 60
End: 2020-05-15

## 2020-05-22 ENCOUNTER — APPOINTMENT (OUTPATIENT)
Dept: MEDICAL GROUP | Facility: IMAGING CENTER | Age: 60
End: 2020-05-22

## 2020-07-27 ENCOUNTER — NON-PROVIDER VISIT (OUTPATIENT)
Dept: MEDICAL GROUP | Facility: MEDICAL CENTER | Age: 60
End: 2020-07-27
Payer: COMMERCIAL

## 2020-07-27 DIAGNOSIS — Z23 NEED FOR VACCINATION: ICD-10-CM

## 2020-07-27 PROCEDURE — 90471 IMMUNIZATION ADMIN: CPT | Performed by: INTERNAL MEDICINE

## 2020-07-27 PROCEDURE — 90715 TDAP VACCINE 7 YRS/> IM: CPT | Performed by: INTERNAL MEDICINE

## 2020-08-22 ENCOUNTER — APPOINTMENT (RX ONLY)
Dept: URBAN - METROPOLITAN AREA CLINIC 4 | Facility: CLINIC | Age: 60
Setting detail: DERMATOLOGY
End: 2020-08-22

## 2020-08-22 DIAGNOSIS — L82.0 INFLAMED SEBORRHEIC KERATOSIS: ICD-10-CM

## 2020-08-22 DIAGNOSIS — D18.0 HEMANGIOMA: ICD-10-CM

## 2020-08-22 DIAGNOSIS — D22 MELANOCYTIC NEVI: ICD-10-CM

## 2020-08-22 DIAGNOSIS — L81.4 OTHER MELANIN HYPERPIGMENTATION: ICD-10-CM

## 2020-08-22 DIAGNOSIS — L72.11 PILAR CYST: ICD-10-CM

## 2020-08-22 PROBLEM — D18.01 HEMANGIOMA OF SKIN AND SUBCUTANEOUS TISSUE: Status: ACTIVE | Noted: 2020-08-22

## 2020-08-22 PROBLEM — D22.5 MELANOCYTIC NEVI OF TRUNK: Status: ACTIVE | Noted: 2020-08-22

## 2020-08-22 PROCEDURE — ? COUNSELING

## 2020-08-22 PROCEDURE — ? OBSERVATION

## 2020-08-22 PROCEDURE — ? LIQUID NITROGEN

## 2020-08-22 PROCEDURE — 99203 OFFICE O/P NEW LOW 30 MIN: CPT | Mod: 25

## 2020-08-22 PROCEDURE — ? DEFER

## 2020-08-22 PROCEDURE — 17110 DESTRUCTION B9 LES UP TO 14: CPT

## 2020-08-22 ASSESSMENT — LOCATION DETAILED DESCRIPTION DERM
LOCATION DETAILED: RIGHT DISTAL POSTERIOR THIGH
LOCATION DETAILED: RIGHT SUPERIOR MEDIAL UPPER BACK
LOCATION DETAILED: LEFT SUPERIOR PARIETAL SCALP
LOCATION DETAILED: LEFT CENTRAL PARIETAL SCALP
LOCATION DETAILED: RIGHT SUPERIOR PARIETAL SCALP
LOCATION DETAILED: RIGHT CYMBA CONCHA
LOCATION DETAILED: RIGHT SUPERIOR OCCIPITAL SCALP
LOCATION DETAILED: INFERIOR THORACIC SPINE
LOCATION DETAILED: LEFT SUPERIOR UPPER BACK

## 2020-08-22 ASSESSMENT — LOCATION SIMPLE DESCRIPTION DERM
LOCATION SIMPLE: RIGHT POSTERIOR THIGH
LOCATION SIMPLE: UPPER BACK
LOCATION SIMPLE: SCALP
LOCATION SIMPLE: RIGHT OCCIPITAL SCALP
LOCATION SIMPLE: RIGHT EAR
LOCATION SIMPLE: RIGHT UPPER BACK
LOCATION SIMPLE: LEFT UPPER BACK

## 2020-08-22 ASSESSMENT — LOCATION ZONE DERM
LOCATION ZONE: SCALP
LOCATION ZONE: TRUNK
LOCATION ZONE: LEG
LOCATION ZONE: EAR

## 2020-08-24 ENCOUNTER — APPOINTMENT (OUTPATIENT)
Dept: RADIOLOGY | Facility: MEDICAL CENTER | Age: 60
End: 2020-08-24
Attending: INTERNAL MEDICINE
Payer: COMMERCIAL

## 2020-08-24 DIAGNOSIS — Z12.31 ENCOUNTER FOR SCREENING MAMMOGRAM FOR MALIGNANT NEOPLASM OF BREAST: ICD-10-CM

## 2020-08-24 PROCEDURE — 77067 SCR MAMMO BI INCL CAD: CPT

## 2020-08-25 ENCOUNTER — APPOINTMENT (OUTPATIENT)
Dept: RADIOLOGY | Facility: MEDICAL CENTER | Age: 60
End: 2020-08-25
Attending: INTERNAL MEDICINE
Payer: COMMERCIAL

## 2020-09-04 ENCOUNTER — APPOINTMENT (RX ONLY)
Dept: URBAN - METROPOLITAN AREA CLINIC 4 | Facility: CLINIC | Age: 60
Setting detail: DERMATOLOGY
End: 2020-09-04

## 2020-09-04 DIAGNOSIS — L72.11 PILAR CYST: ICD-10-CM

## 2020-09-04 DIAGNOSIS — L82.0 INFLAMED SEBORRHEIC KERATOSIS: ICD-10-CM

## 2020-09-04 DIAGNOSIS — L57.0 ACTINIC KERATOSIS: ICD-10-CM

## 2020-09-04 PROCEDURE — 17000 DESTRUCT PREMALG LESION: CPT | Mod: 59

## 2020-09-04 PROCEDURE — 17110 DESTRUCTION B9 LES UP TO 14: CPT

## 2020-09-04 PROCEDURE — 11420 EXC H-F-NK-SP B9+MARG 0.5/<: CPT | Mod: 59

## 2020-09-04 PROCEDURE — ? EXCISION

## 2020-09-04 PROCEDURE — 11420 EXC H-F-NK-SP B9+MARG 0.5/<: CPT | Mod: 59,76

## 2020-09-04 PROCEDURE — ? LIQUID NITROGEN

## 2020-09-04 ASSESSMENT — LOCATION DETAILED DESCRIPTION DERM
LOCATION DETAILED: RIGHT ANTITRAGUS
LOCATION DETAILED: LEFT CENTRAL PARIETAL SCALP
LOCATION DETAILED: INFERIOR THORACIC SPINE
LOCATION DETAILED: MID-OCCIPITAL SCALP

## 2020-09-04 ASSESSMENT — LOCATION SIMPLE DESCRIPTION DERM
LOCATION SIMPLE: POSTERIOR SCALP
LOCATION SIMPLE: SCALP
LOCATION SIMPLE: UPPER BACK
LOCATION SIMPLE: RIGHT EAR

## 2020-09-04 ASSESSMENT — LOCATION ZONE DERM
LOCATION ZONE: EAR
LOCATION ZONE: SCALP
LOCATION ZONE: TRUNK

## 2020-09-04 NOTE — PROCEDURE: LIQUID NITROGEN
Consent: The patient's consent was obtained including but not limited to risks of blistering, scarring, darker or lighter pigmentary change, and recurrence.
Render Post-Care Instructions In Note?: no
Detail Level: Detailed
Number Of Freeze-Thaw Cycles: 2 freeze-thaw cycles
Post-Care Instructions: I reviewed with the patient in detail post-care instructions. Patient is to wear sunprotection, and avoid picking at any of the treated lesions. Pt may apply Vaseline to crusted or scabbing areas.
Duration Of Freeze Thaw-Cycle (Seconds): 0
Consent: The patient's consent was obtained including but not limited to risks of crusting, scabbing, blistering, scarring, darker or lighter pigmentary change, recurrence, incomplete removal and infection.
Medical Necessity Clause: This procedure was medically necessary because the lesions that were treated were:
Medical Necessity Information: It is in your best interest to select a reason for this procedure from the list below. All of these items fulfill various CMS LCD requirements except the new and changing color options.

## 2020-09-14 ENCOUNTER — APPOINTMENT (RX ONLY)
Dept: URBAN - METROPOLITAN AREA CLINIC 4 | Facility: CLINIC | Age: 60
Setting detail: DERMATOLOGY
End: 2020-09-14

## 2020-09-14 DIAGNOSIS — Z48.02 ENCOUNTER FOR REMOVAL OF SUTURES: ICD-10-CM

## 2020-09-14 PROCEDURE — ? SUTURE REMOVAL (GLOBAL PERIOD)

## 2020-09-14 ASSESSMENT — LOCATION ZONE DERM: LOCATION ZONE: SCALP

## 2020-09-14 ASSESSMENT — LOCATION DETAILED DESCRIPTION DERM
LOCATION DETAILED: LEFT SUPERIOR PARIETAL SCALP
LOCATION DETAILED: LEFT CENTRAL PARIETAL SCALP

## 2020-09-14 ASSESSMENT — LOCATION SIMPLE DESCRIPTION DERM: LOCATION SIMPLE: SCALP

## 2020-09-14 NOTE — PROCEDURE: SUTURE REMOVAL (GLOBAL PERIOD)
Detail Level: Detailed
Add 38769 Cpt? (Important Note: In 2017 The Use Of 69781 Is Being Tracked By Cms To Determine Future Global Period Reimbursement For Global Periods): no

## 2020-11-06 ENCOUNTER — APPOINTMENT (OUTPATIENT)
Dept: RADIOLOGY | Facility: MEDICAL CENTER | Age: 60
End: 2020-11-06
Attending: EMERGENCY MEDICINE
Payer: COMMERCIAL

## 2020-11-06 ENCOUNTER — HOSPITAL ENCOUNTER (EMERGENCY)
Facility: MEDICAL CENTER | Age: 60
End: 2020-11-06
Attending: EMERGENCY MEDICINE | Admitting: EMERGENCY MEDICINE
Payer: COMMERCIAL

## 2020-11-06 VITALS
OXYGEN SATURATION: 93 % | SYSTOLIC BLOOD PRESSURE: 140 MMHG | HEART RATE: 93 BPM | BODY MASS INDEX: 24.02 KG/M2 | WEIGHT: 144.18 LBS | RESPIRATION RATE: 20 BRPM | HEIGHT: 65 IN | DIASTOLIC BLOOD PRESSURE: 77 MMHG | TEMPERATURE: 98.9 F

## 2020-11-06 DIAGNOSIS — G51.0 FACIAL NERVE PARALYSIS: ICD-10-CM

## 2020-11-06 DIAGNOSIS — H57.9: ICD-10-CM

## 2020-11-06 PROBLEM — H57.04: Status: ACTIVE | Noted: 2020-11-06

## 2020-11-06 PROBLEM — Z98.890 H/O REDUCTION MAMMOPLASTY: Status: ACTIVE | Noted: 2020-11-06

## 2020-11-06 LAB
ABO + RH BLD: NORMAL
ABO GROUP BLD: NORMAL
ALBUMIN SERPL BCP-MCNC: 3.5 G/DL (ref 3.2–4.9)
ALBUMIN/GLOB SERPL: 1.1 G/DL
ALP SERPL-CCNC: 52 U/L (ref 30–99)
ALT SERPL-CCNC: 15 U/L (ref 2–50)
ANION GAP SERPL CALC-SCNC: 13 MMOL/L (ref 7–16)
APTT PPP: 33.1 SEC (ref 24.7–36)
AST SERPL-CCNC: 21 U/L (ref 12–45)
BASOPHILS # BLD AUTO: 0.4 % (ref 0–1.8)
BASOPHILS # BLD: 0.03 K/UL (ref 0–0.12)
BILIRUB SERPL-MCNC: 0.4 MG/DL (ref 0.1–1.5)
BLD GP AB SCN SERPL QL: NORMAL
BUN SERPL-MCNC: 6 MG/DL (ref 8–22)
CALCIUM SERPL-MCNC: 9.1 MG/DL (ref 8.4–10.2)
CHLORIDE SERPL-SCNC: 98 MMOL/L (ref 96–112)
CO2 SERPL-SCNC: 23 MMOL/L (ref 20–33)
COVID ORDER STATUS COVID19: NORMAL
CREAT SERPL-MCNC: 0.61 MG/DL (ref 0.5–1.4)
EKG IMPRESSION: NORMAL
EOSINOPHIL # BLD AUTO: 0.08 K/UL (ref 0–0.51)
EOSINOPHIL NFR BLD: 1.2 % (ref 0–6.9)
ERYTHROCYTE [DISTWIDTH] IN BLOOD BY AUTOMATED COUNT: 38.7 FL (ref 35.9–50)
GLOBULIN SER CALC-MCNC: 3.1 G/DL (ref 1.9–3.5)
GLUCOSE SERPL-MCNC: 104 MG/DL (ref 65–99)
HCT VFR BLD AUTO: 28.3 % (ref 37–47)
HGB BLD-MCNC: 9.4 G/DL (ref 12–16)
IMM GRANULOCYTES # BLD AUTO: 0.02 K/UL (ref 0–0.11)
IMM GRANULOCYTES NFR BLD AUTO: 0.3 % (ref 0–0.9)
INR PPP: 0.98 (ref 0.87–1.13)
LYMPHOCYTES # BLD AUTO: 1.22 K/UL (ref 1–4.8)
LYMPHOCYTES NFR BLD: 17.6 % (ref 22–41)
MCH RBC QN AUTO: 27.8 PG (ref 27–33)
MCHC RBC AUTO-ENTMCNC: 33.2 G/DL (ref 33.6–35)
MCV RBC AUTO: 83.7 FL (ref 81.4–97.8)
MONOCYTES # BLD AUTO: 0.59 K/UL (ref 0–0.85)
MONOCYTES NFR BLD AUTO: 8.5 % (ref 0–13.4)
NEUTROPHILS # BLD AUTO: 4.99 K/UL (ref 2–7.15)
NEUTROPHILS NFR BLD: 72 % (ref 44–72)
NRBC # BLD AUTO: 0 K/UL
NRBC BLD-RTO: 0 /100 WBC
PLATELET # BLD AUTO: 472 K/UL (ref 164–446)
PMV BLD AUTO: 9.3 FL (ref 9–12.9)
POTASSIUM SERPL-SCNC: 3.8 MMOL/L (ref 3.6–5.5)
PROT SERPL-MCNC: 6.6 G/DL (ref 6–8.2)
PROTHROMBIN TIME: 12.7 SEC (ref 12–14.6)
RBC # BLD AUTO: 3.38 M/UL (ref 4.2–5.4)
RH BLD: NORMAL
SODIUM SERPL-SCNC: 134 MMOL/L (ref 135–145)
TROPONIN T SERPL-MCNC: 7 NG/L (ref 6–19)
WBC # BLD AUTO: 6.9 K/UL (ref 4.8–10.8)

## 2020-11-06 PROCEDURE — 70496 CT ANGIOGRAPHY HEAD: CPT

## 2020-11-06 PROCEDURE — 700117 HCHG RX CONTRAST REV CODE 255: Performed by: EMERGENCY MEDICINE

## 2020-11-06 PROCEDURE — 83036 HEMOGLOBIN GLYCOSYLATED A1C: CPT

## 2020-11-06 PROCEDURE — 99284 EMERGENCY DEPT VISIT MOD MDM: CPT | Performed by: INTERNAL MEDICINE

## 2020-11-06 PROCEDURE — 86900 BLOOD TYPING SEROLOGIC ABO: CPT

## 2020-11-06 PROCEDURE — 80053 COMPREHEN METABOLIC PANEL: CPT

## 2020-11-06 PROCEDURE — A9270 NON-COVERED ITEM OR SERVICE: HCPCS | Performed by: INTERNAL MEDICINE

## 2020-11-06 PROCEDURE — 700102 HCHG RX REV CODE 250 W/ 637 OVERRIDE(OP): Performed by: EMERGENCY MEDICINE

## 2020-11-06 PROCEDURE — 86901 BLOOD TYPING SEROLOGIC RH(D): CPT

## 2020-11-06 PROCEDURE — 85730 THROMBOPLASTIN TIME PARTIAL: CPT

## 2020-11-06 PROCEDURE — C9803 HOPD COVID-19 SPEC COLLECT: HCPCS | Performed by: INTERNAL MEDICINE

## 2020-11-06 PROCEDURE — 99284 EMERGENCY DEPT VISIT MOD MDM: CPT

## 2020-11-06 PROCEDURE — 85025 COMPLETE CBC W/AUTO DIFF WBC: CPT

## 2020-11-06 PROCEDURE — 86850 RBC ANTIBODY SCREEN: CPT

## 2020-11-06 PROCEDURE — U0003 INFECTIOUS AGENT DETECTION BY NUCLEIC ACID (DNA OR RNA); SEVERE ACUTE RESPIRATORY SYNDROME CORONAVIRUS 2 (SARS-COV-2) (CORONAVIRUS DISEASE [COVID-19]), AMPLIFIED PROBE TECHNIQUE, MAKING USE OF HIGH THROUGHPUT TECHNOLOGIES AS DESCRIBED BY CMS-2020-01-R: HCPCS

## 2020-11-06 PROCEDURE — 93005 ELECTROCARDIOGRAM TRACING: CPT | Performed by: EMERGENCY MEDICINE

## 2020-11-06 PROCEDURE — 85610 PROTHROMBIN TIME: CPT

## 2020-11-06 PROCEDURE — 71045 X-RAY EXAM CHEST 1 VIEW: CPT

## 2020-11-06 PROCEDURE — 84484 ASSAY OF TROPONIN QUANT: CPT

## 2020-11-06 PROCEDURE — 700102 HCHG RX REV CODE 250 W/ 637 OVERRIDE(OP): Performed by: INTERNAL MEDICINE

## 2020-11-06 PROCEDURE — A9270 NON-COVERED ITEM OR SERVICE: HCPCS | Performed by: EMERGENCY MEDICINE

## 2020-11-06 RX ORDER — CEPHALEXIN 250 MG/1
500 CAPSULE ORAL 4 TIMES DAILY
Status: CANCELLED | OUTPATIENT
Start: 2020-11-06 | End: 2020-11-10

## 2020-11-06 RX ORDER — PROCHLORPERAZINE EDISYLATE 5 MG/ML
5-10 INJECTION INTRAMUSCULAR; INTRAVENOUS EVERY 4 HOURS PRN
Status: DISCONTINUED | OUTPATIENT
Start: 2020-11-06 | End: 2020-11-06 | Stop reason: HOSPADM

## 2020-11-06 RX ORDER — PROMETHAZINE HYDROCHLORIDE 25 MG/1
12.5-25 TABLET ORAL EVERY 4 HOURS PRN
Status: CANCELLED | OUTPATIENT
Start: 2020-11-06

## 2020-11-06 RX ORDER — ACETAMINOPHEN 325 MG/1
650 TABLET ORAL EVERY 6 HOURS PRN
Status: DISCONTINUED | OUTPATIENT
Start: 2020-11-06 | End: 2020-11-06 | Stop reason: HOSPADM

## 2020-11-06 RX ORDER — POLYETHYLENE GLYCOL 3350 17 G/17G
1 POWDER, FOR SOLUTION ORAL
Status: CANCELLED | OUTPATIENT
Start: 2020-11-06

## 2020-11-06 RX ORDER — CEPHALEXIN 250 MG/1
500 CAPSULE ORAL 4 TIMES DAILY
Status: DISCONTINUED | OUTPATIENT
Start: 2020-11-06 | End: 2020-11-06 | Stop reason: HOSPADM

## 2020-11-06 RX ORDER — ONDANSETRON 2 MG/ML
4 INJECTION INTRAMUSCULAR; INTRAVENOUS EVERY 4 HOURS PRN
Status: CANCELLED | OUTPATIENT
Start: 2020-11-06

## 2020-11-06 RX ORDER — ASPIRIN 81 MG/1
324 TABLET, CHEWABLE ORAL ONCE
Status: COMPLETED | OUTPATIENT
Start: 2020-11-06 | End: 2020-11-06

## 2020-11-06 RX ORDER — AMOXICILLIN 250 MG
2 CAPSULE ORAL 2 TIMES DAILY
Status: CANCELLED | OUTPATIENT
Start: 2020-11-06

## 2020-11-06 RX ORDER — ONDANSETRON 2 MG/ML
4 INJECTION INTRAMUSCULAR; INTRAVENOUS EVERY 4 HOURS PRN
Status: DISCONTINUED | OUTPATIENT
Start: 2020-11-06 | End: 2020-11-06 | Stop reason: HOSPADM

## 2020-11-06 RX ORDER — POLYETHYLENE GLYCOL 3350 17 G/17G
1 POWDER, FOR SOLUTION ORAL
Status: DISCONTINUED | OUTPATIENT
Start: 2020-11-06 | End: 2020-11-06 | Stop reason: HOSPADM

## 2020-11-06 RX ORDER — OXYCODONE HYDROCHLORIDE AND ACETAMINOPHEN 5; 325 MG/1; MG/1
1-2 TABLET ORAL EVERY 4 HOURS PRN
Status: SHIPPED | COMMUNITY
End: 2021-05-20

## 2020-11-06 RX ORDER — AMOXICILLIN 250 MG
2 CAPSULE ORAL 2 TIMES DAILY
Status: DISCONTINUED | OUTPATIENT
Start: 2020-11-06 | End: 2020-11-06 | Stop reason: HOSPADM

## 2020-11-06 RX ORDER — CLINDAMYCIN HYDROCHLORIDE 300 MG/1
300 CAPSULE ORAL 3 TIMES DAILY
Status: SHIPPED | COMMUNITY
End: 2021-05-20

## 2020-11-06 RX ORDER — BISACODYL 10 MG
10 SUPPOSITORY, RECTAL RECTAL
Status: DISCONTINUED | OUTPATIENT
Start: 2020-11-06 | End: 2020-11-06 | Stop reason: HOSPADM

## 2020-11-06 RX ORDER — OXYCODONE HYDROCHLORIDE AND ACETAMINOPHEN 5; 325 MG/1; MG/1
1-2 TABLET ORAL EVERY 4 HOURS PRN
Status: CANCELLED | OUTPATIENT
Start: 2020-11-06

## 2020-11-06 RX ORDER — ONDANSETRON 4 MG/1
4 TABLET, ORALLY DISINTEGRATING ORAL EVERY 4 HOURS PRN
Status: DISCONTINUED | OUTPATIENT
Start: 2020-11-06 | End: 2020-11-06 | Stop reason: HOSPADM

## 2020-11-06 RX ORDER — OXYCODONE HYDROCHLORIDE AND ACETAMINOPHEN 5; 325 MG/1; MG/1
1-2 TABLET ORAL EVERY 4 HOURS PRN
Status: DISCONTINUED | OUTPATIENT
Start: 2020-11-06 | End: 2020-11-06 | Stop reason: HOSPADM

## 2020-11-06 RX ORDER — PROCHLORPERAZINE EDISYLATE 5 MG/ML
5-10 INJECTION INTRAMUSCULAR; INTRAVENOUS EVERY 4 HOURS PRN
Status: CANCELLED | OUTPATIENT
Start: 2020-11-06

## 2020-11-06 RX ORDER — PROMETHAZINE HYDROCHLORIDE 25 MG/1
12.5-25 SUPPOSITORY RECTAL EVERY 4 HOURS PRN
Status: DISCONTINUED | OUTPATIENT
Start: 2020-11-06 | End: 2020-11-06 | Stop reason: HOSPADM

## 2020-11-06 RX ORDER — BISACODYL 10 MG
10 SUPPOSITORY, RECTAL RECTAL
Status: CANCELLED | OUTPATIENT
Start: 2020-11-06

## 2020-11-06 RX ORDER — PROMETHAZINE HYDROCHLORIDE 25 MG/1
12.5-25 TABLET ORAL EVERY 4 HOURS PRN
Status: DISCONTINUED | OUTPATIENT
Start: 2020-11-06 | End: 2020-11-06 | Stop reason: HOSPADM

## 2020-11-06 RX ORDER — CLINDAMYCIN HYDROCHLORIDE 150 MG/1
300 CAPSULE ORAL 3 TIMES DAILY
Status: CANCELLED | OUTPATIENT
Start: 2020-11-06 | End: 2020-11-11

## 2020-11-06 RX ORDER — CLINDAMYCIN HYDROCHLORIDE 150 MG/1
300 CAPSULE ORAL 3 TIMES DAILY
Status: DISCONTINUED | OUTPATIENT
Start: 2020-11-06 | End: 2020-11-06 | Stop reason: HOSPADM

## 2020-11-06 RX ORDER — PILOCARPINE HYDROCHLORIDE 10 MG/ML
1 SOLUTION/ DROPS OPHTHALMIC ONCE
Status: COMPLETED | OUTPATIENT
Start: 2020-11-06 | End: 2020-11-06

## 2020-11-06 RX ORDER — PROMETHAZINE HYDROCHLORIDE 25 MG/1
12.5-25 SUPPOSITORY RECTAL EVERY 4 HOURS PRN
Status: CANCELLED | OUTPATIENT
Start: 2020-11-06

## 2020-11-06 RX ORDER — ONDANSETRON 4 MG/1
4 TABLET, ORALLY DISINTEGRATING ORAL EVERY 4 HOURS PRN
Status: CANCELLED | OUTPATIENT
Start: 2020-11-06

## 2020-11-06 RX ORDER — ACETAMINOPHEN 325 MG/1
650 TABLET ORAL EVERY 6 HOURS PRN
Status: CANCELLED | OUTPATIENT
Start: 2020-11-06

## 2020-11-06 RX ORDER — CEPHALEXIN 500 MG/1
500 CAPSULE ORAL 4 TIMES DAILY
Status: SHIPPED | COMMUNITY
End: 2021-05-20

## 2020-11-06 RX ADMIN — IOHEXOL 80 ML: 350 INJECTION, SOLUTION INTRAVENOUS at 12:42

## 2020-11-06 RX ADMIN — CLINDAMYCIN HYDROCHLORIDE 300 MG: 150 CAPSULE ORAL at 17:38

## 2020-11-06 RX ADMIN — CEPHALEXIN 500 MG: 250 CAPSULE ORAL at 17:39

## 2020-11-06 RX ADMIN — SENNOSIDES-DOCUSATE SODIUM TAB 8.6-50 MG 2 TABLET: 8.6-5 TAB at 17:39

## 2020-11-06 RX ADMIN — ASPIRIN 81 MG CHEWABLE TABLET 324 MG: 81 TABLET CHEWABLE at 14:50

## 2020-11-06 RX ADMIN — PILOCARPINE HYDROCHLORIDE 1 DROP: 10 SOLUTION/ DROPS OPHTHALMIC at 13:35

## 2020-11-06 RX ADMIN — OXYCODONE HYDROCHLORIDE AND ACETAMINOPHEN 2 TABLET: 5; 325 TABLET ORAL at 17:39

## 2020-11-06 ASSESSMENT — ENCOUNTER SYMPTOMS
DIZZINESS: 0
DEPRESSION: 0
DIARRHEA: 0
ABDOMINAL PAIN: 0
CLAUDICATION: 0
PHOTOPHOBIA: 0
FEVER: 0
HEADACHES: 0
SENSORY CHANGE: 0
INSOMNIA: 0
NERVOUS/ANXIOUS: 0
VOMITING: 0
CHILLS: 0
WEAKNESS: 0
CONSTIPATION: 0
HEARTBURN: 0
SPEECH CHANGE: 0
BLURRED VISION: 1
SHORTNESS OF BREATH: 0
MYALGIAS: 0
COUGH: 0

## 2020-11-06 ASSESSMENT — FIBROSIS 4 INDEX: FIB4 SCORE: 1.08

## 2020-11-06 ASSESSMENT — PAIN DESCRIPTION - PAIN TYPE: TYPE: ACUTE PAIN

## 2020-11-06 NOTE — H&P
Hospital Medicine History & Physical Note    Date of Service  11/6/2020    Primary Care Physician  Sarah Eason M.D.    Consultants  Neurology - [facility]    Code Status  Full Code    Chief Complaint  Chief Complaint   Patient presents with   • Other     eye (L) unable to close off and on since mini face lift on 10/20/2020; resolved remotely and now not closing again; sent for neuro work up by eye doctor       History of Presenting Illness  60 y.o. female who presented 11/6/2020 with inability to close her left eye since she had a facelift on 10/20/20 with Dr Temple.  She had blurred vision and felt this resolved until she had another plastic surgery intervention on 11/2/2020 with tummy tuck and bilateral mammoplasty reduction and the blurred vision returned.  She had seen optometrist today who evaluated her and was concerned of left pupil being fixed and dilated and the eyelid unable to fully close.  Patient underwent CTA of the head and this was unremarkable other than right maxillary sinus inflammatory disease and partial left mastoid opacification.  Neurology was consulted by the ERP and Dr Chaudhary recommended an MRI with and without contrast and thin cuts through the brainstem.  Patient will be admitted to the neurology unit at [facility] for neurology consultation.  Patient is still taking keflex and clindamycin postoperatively from the plastic surgery and these will be continued along with her eye drops to keep her eye moist since the left eyelid cannot completely close post facelift.      Review of Systems  Review of Systems   Constitutional: Negative for chills and fever.   HENT: Negative for congestion.    Eyes: Positive for blurred vision. Negative for photophobia.        Vision only in left eye, congenitally blind in the right eye  Left eye with increased blinking and irritation as eyelid doesn't close completely.     Respiratory: Negative for cough and shortness of breath.    Cardiovascular: Negative for  chest pain, claudication and leg swelling.   Gastrointestinal: Negative for abdominal pain, constipation, diarrhea, heartburn and vomiting.   Genitourinary: Negative for dysuria and hematuria.   Musculoskeletal: Negative for joint pain and myalgias.   Skin: Negative for itching and rash.   Neurological: Negative for dizziness, sensory change, speech change, weakness and headaches.   Psychiatric/Behavioral: Negative for depression. The patient is not nervous/anxious and does not have insomnia.        Past Medical History   has a past medical history of Hypercholesterolemia.    Surgical History   has a past surgical history that includes tonsillectomy and tubal ligation.     Family History  family history includes Cancer in her brother and mother; No Known Problems in her father.     Social History   reports that she has never smoked. She has never used smokeless tobacco. She reports that she does not drink alcohol or use drugs.    Allergies  No Known Allergies    Medications  Prior to Admission Medications   Prescriptions Last Dose Informant Patient Reported? Taking?   NON SPECIFIED 11/6/2020 at 0700 Patient Yes Yes   Sig: Take  by mouth See Admin Instructions. Post op vitamin pack throughout day   cephALEXin (KEFLEX) 500 MG Cap 11/6/2020 at 0830 Patient Yes Yes   Sig: Take 500 mg by mouth 4 times a day. Restarted 11/2/20 leftover of 7 day course   clindamycin (CLEOCIN) 300 MG Cap 11/6/2020 at 0830 Patient Yes Yes   Sig: Take 300 mg by mouth 3 times a day. 10 day course started 11/3/20   oxyCODONE-acetaminophen (PERCOCET) 5-325 MG Tab 11/6/2020 at 0830 Patient Yes Yes   Sig: Take 1-2 Tabs by mouth every four hours as needed.      Facility-Administered Medications: None       Physical Exam  Temp:  [37.2 °C (98.9 °F)] 37.2 °C (98.9 °F)  Pulse:  [82-90] 82  Resp:  [20] 20  BP: (150-161)/() 150/82  SpO2:  [92 %-97 %] 92 %    Physical Exam  Vitals signs and nursing note reviewed.   Constitutional:       General: She  is not in acute distress.     Appearance: Normal appearance. She is not ill-appearing.   HENT:      Head: Normocephalic and atraumatic.      Nose: Nose normal.   Eyes:      General: No scleral icterus.     Extraocular Movements: Extraocular movements intact.      Comments: Left eye with good ROM  Currently the pupil is constricted when I examined patient but this was following pilocarpine drops to constrict the eye to see if was secondary to scopolamine patch  Earlier per the ERP, pupil had been dilated and fixed.   Neck:      Musculoskeletal: Neck supple.   Cardiovascular:      Rate and Rhythm: Normal rate and regular rhythm.      Heart sounds: Normal heart sounds. No murmur.   Pulmonary:      Effort: Pulmonary effort is normal.      Breath sounds: Normal breath sounds.   Abdominal:      General: Bowel sounds are normal. There is no distension.      Palpations: Abdomen is soft.   Musculoskeletal:         General: No swelling or tenderness.   Skin:     General: Skin is warm and dry.      Findings: No lesion.   Neurological:      General: No focal deficit present.      Mental Status: She is alert and oriented to person, place, and time.      Cranial Nerves: Cranial nerve deficit (facial nerve decreased - facial drooping post face lift.) present.      Comments: Bilateral facelift scabs healing well, drooping noticed under the left eye.     Psychiatric:         Mood and Affect: Mood normal.         Laboratory:  Recent Labs     11/06/20  1144   WBC 6.9   RBC 3.38*   HEMOGLOBIN 9.4*   HEMATOCRIT 28.3*   MCV 83.7   MCH 27.8   MCHC 33.2*   RDW 38.7   PLATELETCT 472*   MPV 9.3     Recent Labs     11/06/20  1144   SODIUM 134*   POTASSIUM 3.8   CHLORIDE 98   CO2 23   GLUCOSE 104*   BUN 6*   CREATININE 0.61   CALCIUM 9.1     Recent Labs     11/06/20  1144   ALTSGPT 15   ASTSGOT 21   ALKPHOSPHAT 52   TBILIRUBIN 0.4   GLUCOSE 104*     Recent Labs     11/06/20  1144   APTT 33.1   INR 0.98     No results for input(s): NTPROBNP in  the last 72 hours.      Recent Labs     11/06/20  1144   TROPONINT 7       Imaging:  CT-CTA HEAD WITH & W/O-POST PROCESS   Final Result      CT angiogram of the [place] within normal limits.      Right maxillary sinus inflammatory disease      Partial left mastoid opacification most likely indicates chronic effusion. No bony destruction is visualized      DX-CHEST-PORTABLE (1 VIEW)   Final Result      Mild cardiomegaly.      MR-BRAIN-WITH & W/O    (Results Pending)         Assessment/Plan:  I anticipate this patient is appropriate for observation status at this time.    * Fixed dilated pupil of left eye  Assessment & Plan  Sent in from optometry for neurologic evaluation  CTA head shows no evidence of bleed or obvious occlusive disease  Case discussed with neurology, recommending MRI with and without contrast of brain with thin cuts through the brain stem.  Dr Chaudhary to consult.  Incomplete closing of the left eye - continue with eye drops to keep eye moist          H/O reduction mammoplasty  Assessment & Plan  Bilateral drains placed - continue with wound care  Patient of Dr Temple - had face lift on 10/19 and tummy tuck and mammoplasty reduction on 11/2  Post surgical bandages in place - patient to follow up with Dr Temple next week.      Facial nerve paralysis  Assessment & Plan  Following face lift, feeling some improvement with time but decreased control with movements.      Right amblyopia- (present on admission)  Assessment & Plan  Congenitally blind in this eye

## 2020-11-06 NOTE — ED PROVIDER NOTES
ED Provider Note    CHIEF COMPLAINT  Chief Complaint   Patient presents with   • Other     eye (L) unable to close off and on since mini face lift on 10/20/2020; resolved remotely and now not closing again; sent for neuro work up by eye doctor       MEHNAZ  Rosemarie Falk is a 60 y.o. female who presents to the emergency department with vision disturbance in her left eye and an abnormal pupil.  She was referred from optometry.  Patient had a facelift on 10/19.  After surgery her vision felt a little off for a day.  Seem to get better but then it was blurry off and on since then.  She ended up having a tummy tuck on 11/2.  After the tummy tuck the vision in the left eye was even worse.  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  The new vision disturbances are quite disturbing to the patient.  She was seen by optometry today and referred here for stroke evaluation after being found to have dilated nonreactive pupil and incomplete blinking of her left eye.  Patient does report that she had a scopolamine patch on her right arm up until yesterday.  She is no longer using scopolamine.  She was told that she had an abrasion on the left cornea by the optometrist.  She did not notice that she was blinking abnormally until she was told about this by her optometrist.    REVIEW OF SYSTEMS  As per HPI, otherwise a 10 point review of systems is negative    PAST MEDICAL HISTORY  Past Medical History:   Diagnosis Date   • Hypercholesterolemia        SOCIAL HISTORY  Social History     Tobacco Use   • Smoking status: Never Smoker   • Smokeless tobacco: Never Used   Substance Use Topics   • Alcohol use: No   • Drug use: No       SURGICAL HISTORY  Past Surgical History:   Procedure Laterality Date   • TONSILLECTOMY     • TUBAL LIGATION         CURRENT MEDICATIONS  Home Medications    **Home medications have not yet been reviewed for this encounter**         ALLERGIES  No Known  "Allergies    PHYSICAL EXAM  VITAL SIGNS: BP (!) 161/105 Comment: No Hx of  Pulse 90   Temp 37.2 °C (98.9 °F) (Temporal)   Resp 20   Ht 1.651 m (5' 5\")   Wt 65.4 kg (144 lb 2.9 oz)   SpO2 97%   BMI 23.99 kg/m²    Constitutional: Awake and alert  HENT:  Of her face from surgery.  Eyes: Irregular right pupil and she reports she is blind in that eye.  The left pupil is dilated and very minimally reactive.  Normal extraocular muscles and tracking.  There is a lid lag on the left with incomplete closure.  Neck: Supple  Cardiovascular: Normal heart rate, Normal rhythm.  Symmetric peripheral pulses.   Thorax & Lungs: No respiratory distress, No wheezing, No rales, No rhonchi, No chest tenderness.   Abdomen: Bowel sounds normal, soft, non-distended, nontender, no mass  Skin: Warm, Dry, No rash.   Back: No tenderness, No CVA tenderness.   Extremities: No clubbing, cyanosis, edema, no Homans or cords   Neurologic: There is left-sided facial paralysis with lid lag on the left.  This includes complete paralysis of the left forehead.  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  5/5 strength throughout.  Clear speech.  No aphasia or dysarthria.  Psychiatric: Anxious appearing    RADIOLOGY/PROCEDURES  CT-CTA HEAD WITH & W/O-POST PROCESS   Final Result      CT angiogram of the [place] within normal limits.      Right maxillary sinus inflammatory disease      Partial left mastoid opacification most likely indicates chronic effusion. No bony destruction is visualized      DX-CHEST-PORTABLE (1 VIEW)   Final Result      Mild cardiomegaly.           Imaging is interpreted by radiologist    Labs:  Results for orders placed or performed during the hospital encounter of 11/06/20   CBC WITH DIFFERENTIAL   Result Value Ref Range    WBC 6.9 4.8 - 10.8 K/uL    RBC 3.38 (L) 4.20 - 5.40 M/uL    Hemoglobin 9.4 (L) 12.0 - 16.0 g/dL    Hematocrit 28.3 (L) 37.0 - 47.0 %    MCV 83.7 81.4 - 97.8 fL    MCH 27.8 27.0 - 33.0 pg    MCHC 33.2 (L) 33.6 " - 35.0 g/dL    RDW 38.7 35.9 - 50.0 fL    Platelet Count 472 (H) 164 - 446 K/uL    MPV 9.3 9.0 - 12.9 fL    Neutrophils-Polys 72.00 44.00 - 72.00 %    Lymphocytes 17.60 (L) 22.00 - 41.00 %    Monocytes 8.50 0.00 - 13.40 %    Eosinophils 1.20 0.00 - 6.90 %    Basophils 0.40 0.00 - 1.80 %    Immature Granulocytes 0.30 0.00 - 0.90 %    Nucleated RBC 0.00 /100 WBC    Neutrophils (Absolute) 4.99 2.00 - 7.15 K/uL    Lymphs (Absolute) 1.22 1.00 - 4.80 K/uL    Monos (Absolute) 0.59 0.00 - 0.85 K/uL    Eos (Absolute) 0.08 0.00 - 0.51 K/uL    Baso (Absolute) 0.03 0.00 - 0.12 K/uL    Immature Granulocytes (abs) 0.02 0.00 - 0.11 K/uL    NRBC (Absolute) 0.00 K/uL   COMP METABOLIC PANEL   Result Value Ref Range    Sodium 134 (L) 135 - 145 mmol/L    Potassium 3.8 3.6 - 5.5 mmol/L    Chloride 98 96 - 112 mmol/L    Co2 23 20 - 33 mmol/L    Anion Gap 13.0 7.0 - 16.0    Glucose 104 (H) 65 - 99 mg/dL    Bun 6 (L) 8 - 22 mg/dL    Creatinine 0.61 0.50 - 1.40 mg/dL    Calcium 9.1 8.4 - 10.2 mg/dL    AST(SGOT) 21 12 - 45 U/L    ALT(SGPT) 15 2 - 50 U/L    Alkaline Phosphatase 52 30 - 99 U/L    Total Bilirubin 0.4 0.1 - 1.5 mg/dL    Albumin 3.5 3.2 - 4.9 g/dL    Total Protein 6.6 6.0 - 8.2 g/dL    Globulin 3.1 1.9 - 3.5 g/dL    A-G Ratio 1.1 g/dL   PROTHROMBIN TIME   Result Value Ref Range    PT 12.7 12.0 - 14.6 sec    INR 0.98 0.87 - 1.13   APTT   Result Value Ref Range    APTT 33.1 24.7 - 36.0 sec   COD (ADULT)   Result Value Ref Range    ABO Grouping Only A     Rh Grouping Only POS     Antibody Screen-Cod NEG    TROPONIN   Result Value Ref Range    Troponin T 7 6 - 19 ng/L   ABO Rh Confirm   Result Value Ref Range    ABO Rh Confirm A POS    ESTIMATED GFR   Result Value Ref Range    GFR If African American >60 >60 mL/min/1.73 m 2    GFR If Non African American >60 >60 mL/min/1.73 m 2   EKG (NOW)   Result Value Ref Range    Report       [facility] Emergency Dept.    Test Date:  2020-11-06  Pt Name:    MUNA  ALENA             Department[facility]  MRN:        [id number]                      [facility]   Gender:     Female                       Technician: JOSETTE  :        1960                   Requested By:MARINA GAMINO  Order #:    [id number]                    Reading MD: MARINA GAMINO MD    Measurements  Intervals                                Axis  Rate:       86                           P:          38  NH:         155                          QRS:        -13  QRSD:       89                           T:          -2  QT:         370  QTc:        443    Interpretive Statements  Sinus rhythm  Borderline T abnormalities, inferior leads  Baseline wander in lead(s) II  No previous ECG available for comparison  Electronically Signed On 2020 12:13:23 PST by MARINA GAMINO MD         COURSE & MEDICAL DECISION MAKING  Patient presents to the ER with left facial palsy, but also a nearly fixed and dilated pupil.  I do not see a complete 3rd nerve palsy with functional extraocular muscles.  She did report having a scopolamine patch previously, but has not over the last day.  Given the facial nerve palsy with pupillary abnormality  obtain CTA of the head.  This returned negative for intracranial hemorrhage.  There was some fluid in the mastoid, but no tenderness or evidence of infection.  Her laboratory data demonstrates anemia, but otherwise no remarkable findings.  EKG was unremarkable.    I performed a 1% pilocarpine test.  After instillation of pilocarpine, the pupil did constrict indicating that the dilated pupil was not an effect of scopolamine.  Etiology of dilated pupil is not clear at this time.  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  She does not have any other neurologic findings.  Not a candidate for alteplase because of timeline.    I discussed the case with Dr. Zaragoza.  He advised MRI of the brain with and without contrast with thin cuts to  the brainstem.    I consulted Dr. Drew.  Discussed of the above.  She will see the patient.    FINAL IMPRESSION  1.  Fixed and dilated left pupil  2.  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 The accuracy of the dictation is limited to the abilities of the software.  The nursing notes were reviewed and certain aspects of this information were incorporated into this note.      Electronically signed by: Ismael Franco M.D., 11/6/2020 11:16 AM

## 2020-11-06 NOTE — ASSESSMENT & PLAN NOTE
Sent in from optometry for neurologic evaluation  CTA head shows no evidence of bleed or obvious occlusive disease  Case discussed with neurology, recommending MRI with and without contrast of brain with thin cuts through the brain stem.  Dr Chaudhary to consult.  Incomplete closing of the left eye - continue with eye drops to keep eye moist

## 2020-11-06 NOTE — ASSESSMENT & PLAN NOTE
Bilateral drains placed - continue with wound care  Patient of Dr Temple - had face lift on 10/19 and tummy tuck and mammoplasty reduction on 11/2  Post surgical bandages in place - patient to follow up with Dr Temple next week.

## 2020-11-06 NOTE — ED NOTES
Pt changes into gown. Wants to use her eye gtts for moisture, ok with Dr. Franco. Moved into room 7B for EKG, pt has a compression dressing on around her breasts and abd from surgery, 2 SUZANNA drains are present with serosanginous drainage noted, insertion is beneath dressings.

## 2020-11-06 NOTE — ED TRIAGE NOTES
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

## 2020-11-07 LAB
EST. AVERAGE GLUCOSE BLD GHB EST-MCNC: 114 MG/DL
HBA1C MFR BLD: 5.6 % (ref 0–5.6)
SARS-COV-2 RNA RESP QL NAA+PROBE: NOTDETECTED
SPECIMEN SOURCE: NORMAL

## 2020-11-07 NOTE — DISCHARGE SUMMARY
Discharge Summary    CHIEF COMPLAINT ON ADMISSION  Chief Complaint   Patient presents with   • Other     eye (L) unable to close off and on since mini face lift on 10/20/2020; resolved remotely and now not closing again; sent for neuro work up by eye doctor       Reason for Admission  L eye not closing,      Admission Date  11/6/2020    CODE STATUS  Prior    HPI & HOSPITAL COURSE  60 y.o. female who presented 11/6/2020 with inability to close her left eye since she had a facelift on 10/20/20 with Dr Temple.  She had blurred vision and felt this resolved until she had another plastic surgery intervention on 11/2/2020 with tummy tuck and bilateral mammoplasty reduction and the blurred vision returned.  She had seen optometrist today who evaluated her and was concerned of left pupil being fixed and dilated and the eyelid unable to fully close.  Patient underwent CTA of the head and this was unremarkable other than right maxillary sinus inflammatory disease and partial left mastoid opacification.  Neurology was consulted by the ERP and Dr Chaudhary recommended an MRI with and without contrast and thin cuts through the brainstem.  Patient will be admitted to the neurology unit at [facility] for neurology consultation.  Patient is still taking keflex and clindamycin postoperatively from the plastic surgery and these will be continued along with her eye drops to keep her eye moist since the left eyelid cannot completely close post facelift.  Patient was upset about the length of time it was taking to get to [facility] for continued workup and neurology consultation so she chose to leave AMA.  My collegue met with the patient at bedside and explained the risks of leaving.        Discharge Date  11/6/2020- AMA    FOLLOW UP ITEMS POST DISCHARGE  Neurology, MRI brain, PCP, Dr Temple with plastics    DISCHARGE DIAGNOSES  Principal Problem:    Fixed dilated pupil of left eye POA: Unknown  Active Problems:    Right amblyopia POA: Yes     Facial nerve paralysis POA: Unknown    H/O reduction mammoplasty POA: Unknown  Resolved Problems:    * No resolved hospital problems. *      FOLLOW UP  Future Appointments   Date Time Provider Department Center   5/20/2021  7:20 AM GE Salgado     No follow-up provider specified.    MEDICATIONS ON DISCHARGE     Medication List      ASK your doctor about these medications      Instructions   cephALEXin 500 MG Caps  Commonly known as: KEFLEX   Take 500 mg by mouth 4 times a day. Restarted 11/2/20 leftover of 7 day course  Dose: 500 mg     clindamycin 300 MG Caps  Commonly known as: CLEOCIN   Take 300 mg by mouth 3 times a day. 10 day course started 11/3/20  Dose: 300 mg     NON SPECIFIED   Take  by mouth See Admin Instructions. Post op vitamin pack throughout day     oxyCODONE-acetaminophen 5-325 MG Tabs  Commonly known as: PERCOCET   Take 1-2 Tabs by mouth every four hours as needed.  Dose: 1-2 Tab            Allergies  No Known Allergies    DIET  No orders of the defined types were placed in this encounter.      ACTIVITY  As tolerated.  Weight bearing as tolerated    CONSULTATIONS  Dr Chaudhary was consulted but patient left AMA prior to transfer.    PROCEDURES  none    LABORATORY  Lab Results   Component Value Date    SODIUM 134 (L) 11/06/2020    POTASSIUM 3.8 11/06/2020    CHLORIDE 98 11/06/2020    CO2 23 11/06/2020    GLUCOSE 104 (H) 11/06/2020    BUN 6 (L) 11/06/2020    CREATININE 0.61 11/06/2020        Lab Results   Component Value Date    WBC 6.9 11/06/2020    HEMOGLOBIN 9.4 (L) 11/06/2020    HEMATOCRIT 28.3 (L) 11/06/2020    PLATELETCT 472 (H) 11/06/2020        Total time of the discharge process exceeds 32 minutes.

## 2020-11-07 NOTE — PROGRESS NOTES
After lengthy discussion with the patient she spoke with her  and decided she is going to leave AGAINST MEDICAL ADVICE.  I also had an opportunity to talk with the  and he was rather upset of why this is taking so long.  I explained to him that there is a pandemic going on and we are at capacity right now.  The patient was explained that her actions could result in permanent disability, blindness, death.  She is willing to assume the risk and leave AGAINST MEDICAL ADVICE.

## 2020-12-06 ENCOUNTER — PATIENT MESSAGE (OUTPATIENT)
Dept: MEDICAL GROUP | Facility: MEDICAL CENTER | Age: 60
End: 2020-12-06

## 2020-12-06 DIAGNOSIS — Z20.828 EXPOSURE TO SARS-ASSOCIATED CORONAVIRUS: ICD-10-CM

## 2020-12-06 DIAGNOSIS — Z03.818 ENCNTR FOR OBS FOR SUSP EXPSR TO OTH BIOLG AGENTS RULED OUT: ICD-10-CM

## 2020-12-06 DIAGNOSIS — Z11.59 SCREENING FOR VIRAL DISEASE: ICD-10-CM

## 2020-12-07 NOTE — TELEPHONE ENCOUNTER
From: Rosemarie Falk  To: Sarah Eason M.D.  Sent: 12/6/2020 8:31 AM PST  Subject: Non-Urgent Medical Question    Good morning SIDDHARTH - Albino Falk and I (spouse/both patients of Dr. Eason) are scheduled to leave for [facility] early morning January 14th. As you know, [place] requires CLIA approved lab results for covid test taken no more than 72 hours prior to travel to prove negative test. Would it be possible to schedule covid tests to be taken Monday, January 11th in order to have results by the time we leave for [place] please? It falls under pre-travel necessity of proof.    Thank you SIDDHARTH and I hope you are well.     Rosemarie Falk

## 2021-01-30 ENCOUNTER — PATIENT MESSAGE (OUTPATIENT)
Dept: MEDICAL GROUP | Facility: MEDICAL CENTER | Age: 61
End: 2021-01-30

## 2021-01-30 DIAGNOSIS — D72.818 OTHER DECREASED WHITE BLOOD CELL (WBC) COUNT: ICD-10-CM

## 2021-01-30 DIAGNOSIS — E61.1 IRON DEFICIENCY: ICD-10-CM

## 2021-01-30 DIAGNOSIS — Z00.00 PREVENTATIVE HEALTH CARE: ICD-10-CM

## 2021-02-01 NOTE — TELEPHONE ENCOUNTER
From: Rosemarie Falk  To: Sarah Eason M.D.  Sent: 1/30/2021 2:19 PM PST  Subject: Non-Urgent Medical Question    Good day Dr. Eason/SIDDHARTH - I am scheduled for my physical in May and see a standing lab order only for one panel though i see a few different blood test orders for Albino Falk (my ) for his physical. I wonder if there are some additional blood test draws I’m supposed to have done before my physical, including iron since I’m taking an iron supplement for low iron. The supplement says do not take more than 6 months - I started in January and hope to stop taking if iron levels high at time of physical. I’ll do fasting lab in April. Just double checking that the right panels are ordered. Thanks very much, Rosemarie

## 2021-03-15 DIAGNOSIS — Z23 NEED FOR VACCINATION: ICD-10-CM

## 2021-04-07 ENCOUNTER — HOSPITAL ENCOUNTER (OUTPATIENT)
Dept: LAB | Facility: MEDICAL CENTER | Age: 61
End: 2021-04-07
Attending: INTERNAL MEDICINE
Payer: COMMERCIAL

## 2021-04-07 ENCOUNTER — PATIENT MESSAGE (OUTPATIENT)
Dept: MEDICAL GROUP | Facility: MEDICAL CENTER | Age: 61
End: 2021-04-07

## 2021-04-07 DIAGNOSIS — Z00.00 PREVENTATIVE HEALTH CARE: ICD-10-CM

## 2021-04-07 DIAGNOSIS — E61.1 IRON DEFICIENCY: ICD-10-CM

## 2021-04-07 DIAGNOSIS — D72.818 OTHER DECREASED WHITE BLOOD CELL (WBC) COUNT: ICD-10-CM

## 2021-04-07 LAB
ALBUMIN SERPL BCP-MCNC: 4.4 G/DL (ref 3.2–4.9)
ALBUMIN/GLOB SERPL: 1.7 G/DL
ALP SERPL-CCNC: 44 U/L (ref 30–99)
ALT SERPL-CCNC: 18 U/L (ref 2–50)
ANION GAP SERPL CALC-SCNC: 7 MMOL/L (ref 7–16)
AST SERPL-CCNC: 22 U/L (ref 12–45)
BASOPHILS # BLD AUTO: 1 % (ref 0–1.8)
BASOPHILS # BLD: 0.04 K/UL (ref 0–0.12)
BILIRUB SERPL-MCNC: 0.5 MG/DL (ref 0.1–1.5)
BUN SERPL-MCNC: 17 MG/DL (ref 8–22)
CALCIUM SERPL-MCNC: 9.7 MG/DL (ref 8.4–10.2)
CHLORIDE SERPL-SCNC: 102 MMOL/L (ref 96–112)
CHOLEST SERPL-MCNC: 255 MG/DL (ref 100–199)
CO2 SERPL-SCNC: 28 MMOL/L (ref 20–33)
CREAT SERPL-MCNC: 0.65 MG/DL (ref 0.5–1.4)
EOSINOPHIL # BLD AUTO: 0.11 K/UL (ref 0–0.51)
EOSINOPHIL NFR BLD: 2.7 % (ref 0–6.9)
ERYTHROCYTE [DISTWIDTH] IN BLOOD BY AUTOMATED COUNT: 48.9 FL (ref 35.9–50)
FASTING STATUS PATIENT QL REPORTED: NORMAL
GLOBULIN SER CALC-MCNC: 2.6 G/DL (ref 1.9–3.5)
GLUCOSE SERPL-MCNC: 87 MG/DL (ref 65–99)
HCT VFR BLD AUTO: 42.2 % (ref 37–47)
HDLC SERPL-MCNC: 90 MG/DL
HGB BLD-MCNC: 13.5 G/DL (ref 12–16)
IMM GRANULOCYTES # BLD AUTO: 0.01 K/UL (ref 0–0.11)
IMM GRANULOCYTES NFR BLD AUTO: 0.2 % (ref 0–0.9)
IRON SATN MFR SERPL: 34 % (ref 15–55)
IRON SERPL-MCNC: 104 UG/DL (ref 40–170)
LDLC SERPL CALC-MCNC: 152 MG/DL
LYMPHOCYTES # BLD AUTO: 1.57 K/UL (ref 1–4.8)
LYMPHOCYTES NFR BLD: 37.8 % (ref 22–41)
MCH RBC QN AUTO: 27.4 PG (ref 27–33)
MCHC RBC AUTO-ENTMCNC: 32 G/DL (ref 33.6–35)
MCV RBC AUTO: 85.6 FL (ref 81.4–97.8)
MONOCYTES # BLD AUTO: 0.42 K/UL (ref 0–0.85)
MONOCYTES NFR BLD AUTO: 10.1 % (ref 0–13.4)
NEUTROPHILS # BLD AUTO: 2 K/UL (ref 2–7.15)
NEUTROPHILS NFR BLD: 48.2 % (ref 44–72)
NRBC # BLD AUTO: 0 K/UL
NRBC BLD-RTO: 0 /100 WBC
PLATELET # BLD AUTO: 298 K/UL (ref 164–446)
PMV BLD AUTO: 10.6 FL (ref 9–12.9)
POTASSIUM SERPL-SCNC: 4.6 MMOL/L (ref 3.6–5.5)
PROT SERPL-MCNC: 7 G/DL (ref 6–8.2)
RBC # BLD AUTO: 4.93 M/UL (ref 4.2–5.4)
SODIUM SERPL-SCNC: 137 MMOL/L (ref 135–145)
TIBC SERPL-MCNC: 307 UG/DL (ref 250–450)
TRIGL SERPL-MCNC: 63 MG/DL (ref 0–149)
UIBC SERPL-MCNC: 203 UG/DL (ref 110–370)
WBC # BLD AUTO: 4.2 K/UL (ref 4.8–10.8)

## 2021-04-07 PROCEDURE — 83540 ASSAY OF IRON: CPT

## 2021-04-07 PROCEDURE — 85025 COMPLETE CBC W/AUTO DIFF WBC: CPT

## 2021-04-07 PROCEDURE — 82728 ASSAY OF FERRITIN: CPT

## 2021-04-07 PROCEDURE — 36415 COLL VENOUS BLD VENIPUNCTURE: CPT

## 2021-04-07 PROCEDURE — 83550 IRON BINDING TEST: CPT

## 2021-04-07 PROCEDURE — 82607 VITAMIN B-12: CPT

## 2021-04-07 PROCEDURE — 80053 COMPREHEN METABOLIC PANEL: CPT

## 2021-04-07 PROCEDURE — 80061 LIPID PANEL: CPT

## 2021-04-08 LAB
FERRITIN SERPL-MCNC: 32.8 NG/ML (ref 10–291)
VIT B12 SERPL-MCNC: 767 PG/ML (ref 211–911)

## 2021-04-20 ENCOUNTER — IMMUNIZATION (OUTPATIENT)
Dept: FAMILY PLANNING/WOMEN'S HEALTH CLINIC | Facility: IMMUNIZATION CENTER | Age: 61
End: 2021-04-20
Attending: INTERNAL MEDICINE
Payer: COMMERCIAL

## 2021-04-20 DIAGNOSIS — Z23 ENCOUNTER FOR VACCINATION: Primary | ICD-10-CM

## 2021-04-20 DIAGNOSIS — Z23 NEED FOR VACCINATION: ICD-10-CM

## 2021-04-20 PROCEDURE — 91300 PFIZER SARS-COV-2 VACCINE: CPT | Performed by: NURSE PRACTITIONER

## 2021-04-20 PROCEDURE — 0001A PFIZER SARS-COV-2 VACCINE: CPT | Performed by: NURSE PRACTITIONER

## 2021-04-22 ENCOUNTER — PATIENT MESSAGE (OUTPATIENT)
Dept: MEDICAL GROUP | Facility: MEDICAL CENTER | Age: 61
End: 2021-04-22

## 2021-04-22 DIAGNOSIS — M79.673 PAIN OF FOOT, UNSPECIFIED LATERALITY: ICD-10-CM

## 2021-05-03 ENCOUNTER — PATIENT MESSAGE (OUTPATIENT)
Dept: MEDICAL GROUP | Facility: MEDICAL CENTER | Age: 61
End: 2021-05-03

## 2021-05-07 ENCOUNTER — IMMUNIZATION (OUTPATIENT)
Dept: FAMILY PLANNING/WOMEN'S HEALTH CLINIC | Facility: IMMUNIZATION CENTER | Age: 61
End: 2021-05-07
Payer: COMMERCIAL

## 2021-05-07 DIAGNOSIS — Z23 ENCOUNTER FOR VACCINATION: Primary | ICD-10-CM

## 2021-05-07 PROCEDURE — 91300 PFIZER SARS-COV-2 VACCINE: CPT

## 2021-05-07 PROCEDURE — 0002A PFIZER SARS-COV-2 VACCINE: CPT

## 2021-05-20 ENCOUNTER — OFFICE VISIT (OUTPATIENT)
Dept: MEDICAL GROUP | Facility: MEDICAL CENTER | Age: 61
End: 2021-05-20
Payer: COMMERCIAL

## 2021-05-20 VITALS
RESPIRATION RATE: 18 BRPM | WEIGHT: 138.45 LBS | DIASTOLIC BLOOD PRESSURE: 86 MMHG | SYSTOLIC BLOOD PRESSURE: 116 MMHG | OXYGEN SATURATION: 96 % | BODY MASS INDEX: 23.07 KG/M2 | HEART RATE: 66 BPM | HEIGHT: 65 IN | TEMPERATURE: 97.6 F

## 2021-05-20 DIAGNOSIS — M79.673 PAIN OF FOOT, UNSPECIFIED LATERALITY: ICD-10-CM

## 2021-05-20 DIAGNOSIS — Z82.49 FAMILY HISTORY OF HEART DISEASE: ICD-10-CM

## 2021-05-20 DIAGNOSIS — M77.12 LEFT TENNIS ELBOW: ICD-10-CM

## 2021-05-20 DIAGNOSIS — Z00.00 PREVENTATIVE HEALTH CARE: ICD-10-CM

## 2021-05-20 PROCEDURE — 99396 PREV VISIT EST AGE 40-64: CPT | Performed by: INTERNAL MEDICINE

## 2021-05-20 RX ORDER — DIAZEPAM 5 MG/1
TABLET ORAL
COMMUNITY
Start: 2021-04-05 | End: 2021-05-20

## 2021-05-20 ASSESSMENT — FIBROSIS 4 INDEX: FIB4 SCORE: 1.08

## 2021-05-20 ASSESSMENT — PATIENT HEALTH QUESTIONNAIRE - PHQ9: CLINICAL INTERPRETATION OF PHQ2 SCORE: 0

## 2021-05-20 NOTE — PROGRESS NOTES
CC:  Diagnoses of Left tennis elbow, Pain of foot, unspecified laterality, Family history of heart disease, and Preventative health care were pertinent to this visit.    HISTORY OF THE PRESENT ILLNESS: Patient is a 61 y.o. female. This pleasant patient is here today for annual exam.    She feels well overall.  Continues with healthy behaviors including diet, regular exercise, etc.  Started to take beginning golf lessons and developed left tennis elbow.  The elbow is still bothersome despite counterforce brace, icing and ibuprofen.    She is a regular runner as well.  She has developed uncomfortable bunion first left toe and she would appreciate podiatry referral.  Recently got orthotics as well.    Labs reviewed 4/7/2021 total cholesterol 255, triglycerides 63, HDL 90 and .  She has low 10-year ASCVD risk score but positive family history of heart disease.  No cardiopulmonary or strokelike symptoms on exertion.  She does wish to be further risk stratisfied with cardiac CT.  Otherwise normal B12, GFR, hemoglobin return normal and normal CMP.    Allergies: Patient has no known allergies.    No current [facility]-ordered outpatient medications on file.     No current [facility]-ordered facility-administered medications on file.       Past Medical History:   Diagnosis Date   • Hypercholesterolemia        Past Surgical History:   Procedure Laterality Date   • OTHER      breast lift 2021.    • TONSILLECTOMY     • TUBAL LIGATION         Social History     Tobacco Use   • Smoking status: Never Smoker   • Smokeless tobacco: Never Used   Vaping Use   • Vaping Use: Never used   Substance Use Topics   • Alcohol use: No   • Drug use: No       Social History     Social History Narrative   • Not on file       Family History   Problem Relation Age of Onset   • Cancer Mother         breast cancer age 45   • No Known Problems Father    • Cancer Brother         melanoma       Exam: /86 (BP Location: Right arm, Patient Position:  "Sitting, BP Cuff Size: Adult)   Pulse 66   Temp 36.4 °C (97.6 °F) (Temporal)   Resp 18   Ht 1.651 m (5' 5\")   Wt 62.8 kg (138 lb 7.2 oz)   SpO2 96%  Body mass index is 23.04 kg/m².    General: Normal appearing. No distress.  EYES: Conjunctiva clear lids without ptosis, pupils equal  EARS: Normal shape and contour   Pulmonary: Clear to ausculation.  Normal effort. No rales or wheezing.  Cardiovascular: Regular rate and rhythm without significant murmur.   Abdomen: Soft, nontender, nondistended. Normal bowel sounds.  Neurologic: Cranial nerves grossly nonfocal  Skin: Warm and dry.  No obvious lesions.  Musculoskeletal: Normal gait. No extremity cyanosis, clubbing, or edema.  Psych: Normal mood and affect. Alert and oriented x3. Judgment and insight is normal.      Assessment/Plan  1. Left tennis elbow  - REFERRAL TO PHYSICAL THERAPY    2. Pain of foot, unspecified laterality  - REFERRAL TO PODIATRY    3. Family history of heart disease  - CT-CARDIAC SCORING; Future    4. Preventative health care  - Comp Metabolic Panel; Future  - Lipid Profile; Future  - CBC WITH DIFFERENTIAL; Future    rtc 1yr          Please note that this dictation was created using voice recognition software. I have made every reasonable attempt to correct obvious errors, but I expect that there are errors of grammar and possibly content that I did not discover before finalizing the note.    "

## 2021-06-04 ENCOUNTER — HOSPITAL ENCOUNTER (OUTPATIENT)
Dept: RADIOLOGY | Facility: MEDICAL CENTER | Age: 61
End: 2021-06-04
Attending: INTERNAL MEDICINE
Payer: COMMERCIAL

## 2021-06-04 DIAGNOSIS — Z82.49 FAMILY HISTORY OF HEART DISEASE: ICD-10-CM

## 2021-06-04 PROCEDURE — 4410556 CT-CARDIAC SCORING (SELF PAY ONLY)

## 2021-06-27 ENCOUNTER — PATIENT MESSAGE (OUTPATIENT)
Dept: MEDICAL GROUP | Facility: MEDICAL CENTER | Age: 61
End: 2021-06-27

## 2021-06-27 DIAGNOSIS — E78.5 DYSLIPIDEMIA: ICD-10-CM

## 2021-12-14 ENCOUNTER — APPOINTMENT (OUTPATIENT)
Dept: MEDICAL GROUP | Facility: MEDICAL CENTER | Age: 61
End: 2021-12-14
Payer: COMMERCIAL

## 2022-01-04 ENCOUNTER — OFFICE VISIT (OUTPATIENT)
Dept: MEDICAL GROUP | Facility: MEDICAL CENTER | Age: 62
End: 2022-01-04
Payer: COMMERCIAL

## 2022-01-04 VITALS
DIASTOLIC BLOOD PRESSURE: 64 MMHG | HEART RATE: 63 BPM | BODY MASS INDEX: 24.62 KG/M2 | HEIGHT: 64 IN | TEMPERATURE: 97.6 F | SYSTOLIC BLOOD PRESSURE: 90 MMHG | WEIGHT: 144.18 LBS | OXYGEN SATURATION: 97 % | RESPIRATION RATE: 14 BRPM

## 2022-01-04 DIAGNOSIS — E55.9 VITAMIN D INSUFFICIENCY: ICD-10-CM

## 2022-01-04 DIAGNOSIS — H54.40 BLINDNESS OF ONE EYE: ICD-10-CM

## 2022-01-04 DIAGNOSIS — Z86.2 HISTORY OF ANEMIA: ICD-10-CM

## 2022-01-04 DIAGNOSIS — Z13.29 THYROID DISORDER SCREEN: ICD-10-CM

## 2022-01-04 DIAGNOSIS — Z00.00 PREVENTATIVE HEALTH CARE: ICD-10-CM

## 2022-01-04 DIAGNOSIS — E78.5 DYSLIPIDEMIA: ICD-10-CM

## 2022-01-04 DIAGNOSIS — E78.00 HYPERCHOLESTEROLEMIA: ICD-10-CM

## 2022-01-04 DIAGNOSIS — Z11.51 ENCOUNTER FOR SCREENING FOR HUMAN PAPILLOMAVIRUS (HPV): ICD-10-CM

## 2022-01-04 DIAGNOSIS — Z12.31 ENCOUNTER FOR SCREENING MAMMOGRAM FOR BREAST CANCER: ICD-10-CM

## 2022-01-04 DIAGNOSIS — Z80.8 FHX: MELANOMA: ICD-10-CM

## 2022-01-04 DIAGNOSIS — Z80.3 FAMILY HISTORY OF MALIGNANT NEOPLASM OF BREAST: ICD-10-CM

## 2022-01-04 PROCEDURE — 99213 OFFICE O/P EST LOW 20 MIN: CPT | Performed by: INTERNAL MEDICINE

## 2022-01-04 ASSESSMENT — ENCOUNTER SYMPTOMS
SPUTUM PRODUCTION: 0
HEMOPTYSIS: 0
DIARRHEA: 0
ORTHOPNEA: 0
CHILLS: 0
DIZZINESS: 0
SHORTNESS OF BREATH: 0
LOSS OF CONSCIOUSNESS: 0
DEPRESSION: 0
FEVER: 0
NAUSEA: 0
PALPITATIONS: 0
WEIGHT LOSS: 0
COUGH: 0
WEAKNESS: 0
WHEEZING: 0
SORE THROAT: 0

## 2022-01-04 ASSESSMENT — LIFESTYLE VARIABLES: SUBSTANCE_ABUSE: 0

## 2022-01-04 ASSESSMENT — PATIENT HEALTH QUESTIONNAIRE - PHQ9: CLINICAL INTERPRETATION OF PHQ2 SCORE: 0

## 2022-01-04 ASSESSMENT — FIBROSIS 4 INDEX: FIB4 SCORE: 1.08

## 2022-01-04 NOTE — ASSESSMENT & PLAN NOTE
10-year cardiac risk is less than 2%, no indication for statin therapy at this time.  Patient will continue exercise daily, and maintaining healthy weight.  Will repeat lipid panel.

## 2022-01-04 NOTE — PROGRESS NOTES
Subjective:     Chief Complaint   Patient presents with   • Establish Care   • Bump     callus on right pinkie toe     HISTORY OF THE PRESENT ILLNESS: Patient is a 61 y.o. female. This pleasant patient is here today to establish care and to evaluate callus on her right pinky toe. Her prior PCP was Dr Eason.    Patient takes no prescription medications and has no active complaints.  She exercises regularly and living a healthy lifestyle.    Patient blood pressure is 90/64 today at the time of the visit.  Patient reports that she her blood pressure baseline runs  systolic.  She denies any dizziness, headache, rapid heartbeats.  Recommended 6 to 8 glasses of water per day and extra 32 ounces for every mile of hiking or 1 hour of exercise in the gym.    Problem   Hypercholesterolemia      Lab Results   Component Value Date/Time    CHOLSTRLTOT 255 (H) 04/07/2021 07:14 AM    TRIGLYCERIDE 63 04/07/2021 07:14 AM    HDL 90 04/07/2021 07:14 AM     (H) 04/07/2021 07:14 AM   Her 10-year cardiovascular risk is less than 2%. No indication for statins.  Advised on diet exercise and weight loss.  Recommended patient to install my fitness pal for calorie count.  Patient is currently exercising a lot: 3 times a week weightlifting sessions with , walking 4-1/2 miles almost every day.  She is willing to lose 10 pounds of her weight.  I recommended her to install my fitness pal to do calorie count.          Fhx: Melanoma    Patient's brother had melanoma removed from his face on several locations.  Instructed patient on SPF and sun protection.  Recommended yearly total-body skin check, referral to dermatology.       Family History of Malignant Neoplasm of Breast    Family history is positive for breast cancer in mother, she passed away at age of 46 due to breast cancer.  Patient reports that she was tested for genetic abnormalities and she believes she was negative for BRCA.     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        Past Medical History:   Diagnosis Date   • Hypercholesterolemia      Past Surgical History:   Procedure Laterality Date   • CATARACT EXTRACTION WITH IOL Right    • OTHER      breast lift 2021.    • TONSILLECTOMY     • TUBAL LIGATION       Family History   Problem Relation Age of Onset   • Cancer Mother         breast cancer age 45   • No Known Problems Father    • Cancer Brother         melanoma     Social History     Tobacco Use   • Smoking status: Never Smoker   • Smokeless tobacco: Never Used   Vaping Use   • Vaping Use: Never used   Substance Use Topics   • Alcohol use: No   • Drug use: No     Current Outpatient Medications Ordered in Epic   Medication Sig Dispense Refill   • multivitamin (THERAGRAN) Tab Take 1 Tablet by mouth every day.       No current [facility]-ordered facility-administered medications on file.     Health Maintenance: Due for mammogram-will schedule, due for Pap smear-gynecology referral provided, due for influenza vaccine-not interested in getting one, due for zoster vaccine-instructed to get at the pharmacy.    ROS:   Review of Systems   Constitutional: Negative for chills, fever, malaise/fatigue and weight loss.   HENT: Negative for congestion and sore throat.    Respiratory: Negative for cough, hemoptysis, sputum production, shortness of breath and wheezing.    Cardiovascular: Negative for chest pain, palpitations and orthopnea.   Gastrointestinal: Negative for diarrhea and nausea.   Genitourinary: Negative for dysuria.   Musculoskeletal: Positive for joint pain (left knee mild).   Neurological: Negative for dizziness, loss of consciousness and weakness.   Psychiatric/Behavioral: Negative for depression and substance abuse.     Objective:     Exam: BP (!) 90/64 (BP Location: Left arm, Patient Position: Sitting, BP Cuff Size: Adult)   Pulse 63   Temp 36.4 °C (97.6 °F) (Temporal)    "Resp 14   Ht 1.626 m (5' 4\")   Wt 65.4 kg (144 lb 2.9 oz)   SpO2 97%  Body mass index is 24.75 kg/m².    Physical Exam  Constitutional:       General: She is not in acute distress.     Appearance: She is normal weight. She is not ill-appearing, toxic-appearing or diaphoretic.   HENT:      Head: Normocephalic and atraumatic.      Nose: Nose normal.      Mouth/Throat:      Mouth: Mucous membranes are moist.      Pharynx: Oropharynx is clear.   Eyes:      General:         Right eye: No discharge.         Left eye: No discharge.      Extraocular Movements:      Right eye: Abnormal extraocular motion (not able to track) present.      Conjunctiva/sclera: Conjunctivae normal.      Comments: Right eye blindness   Cardiovascular:      Rate and Rhythm: Normal rate.      Pulses: Normal pulses.      Heart sounds: No murmur heard.  No friction rub.   Pulmonary:      Effort: Pulmonary effort is normal. No respiratory distress.      Breath sounds: No stridor. No wheezing.   Musculoskeletal:         General: No swelling or deformity.        Feet:    Feet:      Comments: Callus     Neurological:      Mental Status: She is alert.   Psychiatric:         Mood and Affect: Mood normal.         Behavior: Behavior normal.       Labs: Reviewed CBC, CMP, lipid panel from 04/07/2021    Assessment & Plan:   61 y.o. female with the following -    Problem List Items Addressed This Visit     Blindness of one eye    Family history of malignant neoplasm of breast     Patient is due for mammogram, instructed to schedule 4-6 after the COVID-19 booster.         FHx: melanoma     Counseled on SPF and sun protection, referred to dermatology for total-body skin exam.         Relevant Orders    Referral to Dermatology    Hypercholesterolemia     10-year cardiac risk is less than 2%, no indication for statin therapy at this time.  Patient will continue exercise daily, and maintaining healthy weight.  Will repeat lipid panel.           Other Visit " Diagnoses     Encounter for screening for human papillomavirus (HPV)        Relevant Orders    Referral to Gynecology    Encounter for screening mammogram for breast cancer        Relevant Orders    MA-SCREENING MAMMO BILAT W/TOMOSYNTHESIS W/CAD    Dyslipidemia        Relevant Orders    Lipid Profile    HEMOGLOBIN A1C    History of anemia        Relevant Orders    CBC WITH DIFFERENTIAL    Preventative health care        Relevant Orders    Comp Metabolic Panel    Vitamin D insufficiency        Relevant Orders    VITAMIN D,25 HYDROXY    Thyroid disorder screen        Relevant Orders    TSH WITH REFLEX TO FT4          Return in about 1 year (around 1/4/2023), or if symptoms worsen or fail to improve.    Please note that this dictation was created using voice recognition software. I have made every reasonable attempt to correct obvious errors, but I expect that there are errors of grammar and possibly content that I did not discover before finalizing the note.

## 2022-02-16 ENCOUNTER — HOSPITAL ENCOUNTER (OUTPATIENT)
Dept: LAB | Facility: MEDICAL CENTER | Age: 62
End: 2022-02-16
Attending: INTERNAL MEDICINE
Payer: COMMERCIAL

## 2022-02-16 DIAGNOSIS — Z13.29 THYROID DISORDER SCREEN: ICD-10-CM

## 2022-02-16 DIAGNOSIS — E78.5 DYSLIPIDEMIA: ICD-10-CM

## 2022-02-16 DIAGNOSIS — Z00.00 PREVENTATIVE HEALTH CARE: ICD-10-CM

## 2022-02-16 DIAGNOSIS — E55.9 VITAMIN D INSUFFICIENCY: ICD-10-CM

## 2022-02-16 DIAGNOSIS — Z86.2 HISTORY OF ANEMIA: ICD-10-CM

## 2022-02-16 LAB
25(OH)D3 SERPL-MCNC: 51 NG/ML (ref 30–100)
ALBUMIN SERPL BCP-MCNC: 4.5 G/DL (ref 3.2–4.9)
ALBUMIN/GLOB SERPL: 1.7 G/DL
ALP SERPL-CCNC: 49 U/L (ref 30–99)
ALT SERPL-CCNC: 18 U/L (ref 2–50)
ANION GAP SERPL CALC-SCNC: 10 MMOL/L (ref 7–16)
AST SERPL-CCNC: 21 U/L (ref 12–45)
BASOPHILS # BLD AUTO: 0.9 % (ref 0–1.8)
BASOPHILS # BLD: 0.04 K/UL (ref 0–0.12)
BILIRUB SERPL-MCNC: 0.5 MG/DL (ref 0.1–1.5)
BUN SERPL-MCNC: 14 MG/DL (ref 8–22)
CALCIUM SERPL-MCNC: 9.6 MG/DL (ref 8.4–10.2)
CHLORIDE SERPL-SCNC: 102 MMOL/L (ref 96–112)
CHOLEST SERPL-MCNC: 240 MG/DL (ref 100–199)
CO2 SERPL-SCNC: 26 MMOL/L (ref 20–33)
CREAT SERPL-MCNC: 0.68 MG/DL (ref 0.5–1.4)
EOSINOPHIL # BLD AUTO: 0.11 K/UL (ref 0–0.51)
EOSINOPHIL NFR BLD: 2.3 % (ref 0–6.9)
ERYTHROCYTE [DISTWIDTH] IN BLOOD BY AUTOMATED COUNT: 43.2 FL (ref 35.9–50)
EST. AVERAGE GLUCOSE BLD GHB EST-MCNC: 117 MG/DL
FASTING STATUS PATIENT QL REPORTED: NORMAL
GLOBULIN SER CALC-MCNC: 2.7 G/DL (ref 1.9–3.5)
GLUCOSE SERPL-MCNC: 98 MG/DL (ref 65–99)
HBA1C MFR BLD: 5.7 % (ref 4–5.6)
HCT VFR BLD AUTO: 40.3 % (ref 37–47)
HDLC SERPL-MCNC: 75 MG/DL
HGB BLD-MCNC: 13.2 G/DL (ref 12–16)
IMM GRANULOCYTES # BLD AUTO: 0.01 K/UL (ref 0–0.11)
IMM GRANULOCYTES NFR BLD AUTO: 0.2 % (ref 0–0.9)
LDLC SERPL CALC-MCNC: 153 MG/DL
LYMPHOCYTES # BLD AUTO: 1.82 K/UL (ref 1–4.8)
LYMPHOCYTES NFR BLD: 38.8 % (ref 22–41)
MCH RBC QN AUTO: 28.2 PG (ref 27–33)
MCHC RBC AUTO-ENTMCNC: 32.8 G/DL (ref 33.6–35)
MCV RBC AUTO: 86.1 FL (ref 81.4–97.8)
MONOCYTES # BLD AUTO: 0.48 K/UL (ref 0–0.85)
MONOCYTES NFR BLD AUTO: 10.2 % (ref 0–13.4)
NEUTROPHILS # BLD AUTO: 2.23 K/UL (ref 2–7.15)
NEUTROPHILS NFR BLD: 47.6 % (ref 44–72)
NRBC # BLD AUTO: 0 K/UL
NRBC BLD-RTO: 0 /100 WBC
PLATELET # BLD AUTO: 320 K/UL (ref 164–446)
PMV BLD AUTO: 10.6 FL (ref 9–12.9)
POTASSIUM SERPL-SCNC: 4.1 MMOL/L (ref 3.6–5.5)
PROT SERPL-MCNC: 7.2 G/DL (ref 6–8.2)
RBC # BLD AUTO: 4.68 M/UL (ref 4.2–5.4)
SODIUM SERPL-SCNC: 138 MMOL/L (ref 135–145)
TRIGL SERPL-MCNC: 59 MG/DL (ref 0–149)
TSH SERPL DL<=0.005 MIU/L-ACNC: 2.71 UIU/ML (ref 0.38–5.33)
WBC # BLD AUTO: 4.7 K/UL (ref 4.8–10.8)

## 2022-02-16 PROCEDURE — 80061 LIPID PANEL: CPT

## 2022-02-16 PROCEDURE — 36415 COLL VENOUS BLD VENIPUNCTURE: CPT

## 2022-02-16 PROCEDURE — 83036 HEMOGLOBIN GLYCOSYLATED A1C: CPT

## 2022-02-16 PROCEDURE — 84443 ASSAY THYROID STIM HORMONE: CPT

## 2022-02-16 PROCEDURE — 85025 COMPLETE CBC W/AUTO DIFF WBC: CPT

## 2022-02-16 PROCEDURE — 82306 VITAMIN D 25 HYDROXY: CPT

## 2022-02-16 PROCEDURE — 80053 COMPREHEN METABOLIC PANEL: CPT

## 2022-02-18 ENCOUNTER — HOSPITAL ENCOUNTER (OUTPATIENT)
Dept: RADIOLOGY | Facility: MEDICAL CENTER | Age: 62
End: 2022-02-18
Attending: INTERNAL MEDICINE
Payer: COMMERCIAL

## 2022-02-18 DIAGNOSIS — Z12.31 ENCOUNTER FOR SCREENING MAMMOGRAM FOR BREAST CANCER: ICD-10-CM

## 2022-02-18 PROCEDURE — 77063 BREAST TOMOSYNTHESIS BI: CPT

## 2022-04-05 ENCOUNTER — APPOINTMENT (RX ONLY)
Dept: URBAN - METROPOLITAN AREA CLINIC 35 | Facility: CLINIC | Age: 62
Setting detail: DERMATOLOGY
End: 2022-04-05

## 2022-04-05 DIAGNOSIS — L81.4 OTHER MELANIN HYPERPIGMENTATION: ICD-10-CM

## 2022-04-05 DIAGNOSIS — D18.0 HEMANGIOMA: ICD-10-CM

## 2022-04-05 DIAGNOSIS — L82.1 OTHER SEBORRHEIC KERATOSIS: ICD-10-CM

## 2022-04-05 DIAGNOSIS — L72.11 PILAR CYST: ICD-10-CM

## 2022-04-05 DIAGNOSIS — Z71.89 OTHER SPECIFIED COUNSELING: ICD-10-CM

## 2022-04-05 DIAGNOSIS — D22 MELANOCYTIC NEVI: ICD-10-CM

## 2022-04-05 PROBLEM — D18.01 HEMANGIOMA OF SKIN AND SUBCUTANEOUS TISSUE: Status: ACTIVE | Noted: 2022-04-05

## 2022-04-05 PROBLEM — D22.5 MELANOCYTIC NEVI OF TRUNK: Status: ACTIVE | Noted: 2022-04-05

## 2022-04-05 PROCEDURE — ? COUNSELING

## 2022-04-05 PROCEDURE — ? SUNSCREEN RECOMMENDATIONS

## 2022-04-05 PROCEDURE — 99213 OFFICE O/P EST LOW 20 MIN: CPT

## 2022-04-05 ASSESSMENT — LOCATION SIMPLE DESCRIPTION DERM
LOCATION SIMPLE: RIGHT SHOULDER
LOCATION SIMPLE: RIGHT POSTERIOR THIGH
LOCATION SIMPLE: SCALP
LOCATION SIMPLE: LEFT UPPER BACK

## 2022-04-05 ASSESSMENT — LOCATION ZONE DERM
LOCATION ZONE: LEG
LOCATION ZONE: ARM
LOCATION ZONE: TRUNK
LOCATION ZONE: SCALP

## 2022-04-05 ASSESSMENT — LOCATION DETAILED DESCRIPTION DERM
LOCATION DETAILED: RIGHT POSTERIOR SHOULDER
LOCATION DETAILED: RIGHT SUPERIOR PARIETAL SCALP
LOCATION DETAILED: LEFT SUPERIOR UPPER BACK
LOCATION DETAILED: RIGHT DISTAL POSTERIOR THIGH

## 2022-05-09 ENCOUNTER — HOSPITAL ENCOUNTER (OUTPATIENT)
Facility: MEDICAL CENTER | Age: 62
End: 2022-05-09
Attending: OBSTETRICS & GYNECOLOGY
Payer: COMMERCIAL

## 2022-05-09 PROCEDURE — 88175 CYTOPATH C/V AUTO FLUID REDO: CPT

## 2022-05-09 PROCEDURE — 87624 HPV HI-RISK TYP POOLED RSLT: CPT

## 2022-05-10 LAB
AMBIGUOUS DTTM AMBI4: NORMAL
CYTOLOGY REG CYTOL: NORMAL
HPV HR 12 DNA CVX QL NAA+PROBE: NEGATIVE
HPV16 DNA SPEC QL NAA+PROBE: NEGATIVE
HPV18 DNA SPEC QL NAA+PROBE: NEGATIVE
SPECIMEN SOURCE: NORMAL

## 2022-07-11 ENCOUNTER — RESEARCH ENCOUNTER (OUTPATIENT)
Dept: RESEARCH | Facility: WORKSITE | Age: 62
End: 2022-07-11
Payer: COMMERCIAL

## 2022-07-11 DIAGNOSIS — Z00.6 RESEARCH STUDY PATIENT: Primary | ICD-10-CM

## 2022-08-11 LAB
APOB+LDLR+PCSK9 GENE MUT ANL BLD/T: NOT DETECTED
BRCA1+BRCA2 DEL+DUP + FULL MUT ANL BLD/T: NOT DETECTED
MLH1+MSH2+MSH6+PMS2 GN DEL+DUP+FUL M: NOT DETECTED

## 2022-12-20 DIAGNOSIS — E78.5 DYSLIPIDEMIA: ICD-10-CM

## 2022-12-20 DIAGNOSIS — E55.9 VITAMIN D DEFICIENCY: ICD-10-CM

## 2022-12-20 DIAGNOSIS — E78.00 HYPERCHOLESTEROLEMIA: ICD-10-CM

## 2022-12-20 DIAGNOSIS — Z86.2 HISTORY OF ANEMIA: ICD-10-CM

## 2022-12-20 DIAGNOSIS — R73.03 PREDIABETES: ICD-10-CM

## 2022-12-20 DIAGNOSIS — Z13.29 THYROID DISORDER SCREEN: ICD-10-CM

## 2022-12-20 DIAGNOSIS — Z00.00 HEALTH CARE MAINTENANCE: ICD-10-CM

## 2023-03-21 ENCOUNTER — APPOINTMENT (OUTPATIENT)
Dept: RADIOLOGY | Facility: MEDICAL CENTER | Age: 63
End: 2023-03-21
Attending: INTERNAL MEDICINE
Payer: COMMERCIAL

## 2023-03-21 DIAGNOSIS — Z12.31 VISIT FOR SCREENING MAMMOGRAM: ICD-10-CM

## 2023-03-21 PROCEDURE — 77067 SCR MAMMO BI INCL CAD: CPT

## 2023-05-01 DIAGNOSIS — E78.5 DYSLIPIDEMIA: ICD-10-CM

## 2023-06-19 ENCOUNTER — TELEPHONE (OUTPATIENT)
Dept: MEDICAL GROUP | Facility: MEDICAL CENTER | Age: 63
End: 2023-06-19
Payer: COMMERCIAL

## 2023-06-19 SDOH — ECONOMIC STABILITY: HOUSING INSECURITY
IN THE LAST 12 MONTHS, WAS THERE A TIME WHEN YOU DID NOT HAVE A STEADY PLACE TO SLEEP OR SLEPT IN A SHELTER (INCLUDING NOW)?: NO

## 2023-06-19 SDOH — ECONOMIC STABILITY: FOOD INSECURITY: WITHIN THE PAST 12 MONTHS, THE FOOD YOU BOUGHT JUST DIDN'T LAST AND YOU DIDN'T HAVE MONEY TO GET MORE.: NEVER TRUE

## 2023-06-19 SDOH — ECONOMIC STABILITY: INCOME INSECURITY: HOW HARD IS IT FOR YOU TO PAY FOR THE VERY BASICS LIKE FOOD, HOUSING, MEDICAL CARE, AND HEATING?: NOT VERY HARD

## 2023-06-19 SDOH — ECONOMIC STABILITY: INCOME INSECURITY: IN THE LAST 12 MONTHS, WAS THERE A TIME WHEN YOU WERE NOT ABLE TO PAY THE MORTGAGE OR RENT ON TIME?: NO

## 2023-06-19 SDOH — HEALTH STABILITY: PHYSICAL HEALTH: ON AVERAGE, HOW MANY MINUTES DO YOU ENGAGE IN EXERCISE AT THIS LEVEL?: 60 MIN

## 2023-06-19 SDOH — ECONOMIC STABILITY: TRANSPORTATION INSECURITY
IN THE PAST 12 MONTHS, HAS LACK OF RELIABLE TRANSPORTATION KEPT YOU FROM MEDICAL APPOINTMENTS, MEETINGS, WORK OR FROM GETTING THINGS NEEDED FOR DAILY LIVING?: NO

## 2023-06-19 SDOH — ECONOMIC STABILITY: FOOD INSECURITY: WITHIN THE PAST 12 MONTHS, YOU WORRIED THAT YOUR FOOD WOULD RUN OUT BEFORE YOU GOT MONEY TO BUY MORE.: NEVER TRUE

## 2023-06-19 SDOH — HEALTH STABILITY: PHYSICAL HEALTH: ON AVERAGE, HOW MANY DAYS PER WEEK DO YOU ENGAGE IN MODERATE TO STRENUOUS EXERCISE (LIKE A BRISK WALK)?: 7 DAYS

## 2023-06-19 SDOH — ECONOMIC STABILITY: HOUSING INSECURITY: IN THE LAST 12 MONTHS, HOW MANY PLACES HAVE YOU LIVED?: 1

## 2023-06-19 SDOH — ECONOMIC STABILITY: TRANSPORTATION INSECURITY
IN THE PAST 12 MONTHS, HAS THE LACK OF TRANSPORTATION KEPT YOU FROM MEDICAL APPOINTMENTS OR FROM GETTING MEDICATIONS?: NO

## 2023-06-19 SDOH — ECONOMIC STABILITY: TRANSPORTATION INSECURITY
IN THE PAST 12 MONTHS, HAS LACK OF TRANSPORTATION KEPT YOU FROM MEETINGS, WORK, OR FROM GETTING THINGS NEEDED FOR DAILY LIVING?: NO

## 2023-06-19 SDOH — HEALTH STABILITY: MENTAL HEALTH
STRESS IS WHEN SOMEONE FEELS TENSE, NERVOUS, ANXIOUS, OR CAN'T SLEEP AT NIGHT BECAUSE THEIR MIND IS TROUBLED. HOW STRESSED ARE YOU?: NOT AT ALL

## 2023-06-19 ASSESSMENT — SOCIAL DETERMINANTS OF HEALTH (SDOH)
HOW HARD IS IT FOR YOU TO PAY FOR THE VERY BASICS LIKE FOOD, HOUSING, MEDICAL CARE, AND HEATING?: NOT VERY HARD
HOW OFTEN DO YOU HAVE SIX OR MORE DRINKS ON ONE OCCASION: NEVER
HOW OFTEN DO YOU ATTENT MEETINGS OF THE CLUB OR ORGANIZATION YOU BELONG TO?: MORE THAN 4 TIMES PER YEAR
IN A TYPICAL WEEK, HOW MANY TIMES DO YOU TALK ON THE PHONE WITH FAMILY, FRIENDS, OR NEIGHBORS?: MORE THAN THREE TIMES A WEEK
HOW OFTEN DO YOU ATTEND CHURCH OR RELIGIOUS SERVICES?: MORE THAN 4 TIMES PER YEAR
HOW OFTEN DO YOU GET TOGETHER WITH FRIENDS OR RELATIVES?: MORE THAN THREE TIMES A WEEK
WITHIN THE PAST 12 MONTHS, YOU WORRIED THAT YOUR FOOD WOULD RUN OUT BEFORE YOU GOT THE MONEY TO BUY MORE: NEVER TRUE
HOW MANY DRINKS CONTAINING ALCOHOL DO YOU HAVE ON A TYPICAL DAY WHEN YOU ARE DRINKING: PATIENT DOES NOT DRINK
HOW OFTEN DO YOU GET TOGETHER WITH FRIENDS OR RELATIVES?: MORE THAN THREE TIMES A WEEK
HOW OFTEN DO YOU ATTENT MEETINGS OF THE CLUB OR ORGANIZATION YOU BELONG TO?: MORE THAN 4 TIMES PER YEAR
HOW OFTEN DO YOU ATTEND CHURCH OR RELIGIOUS SERVICES?: MORE THAN 4 TIMES PER YEAR
DO YOU BELONG TO ANY CLUBS OR ORGANIZATIONS SUCH AS CHURCH GROUPS UNIONS, FRATERNAL OR ATHLETIC GROUPS, OR SCHOOL GROUPS?: YES
IN A TYPICAL WEEK, HOW MANY TIMES DO YOU TALK ON THE PHONE WITH FAMILY, FRIENDS, OR NEIGHBORS?: MORE THAN THREE TIMES A WEEK
DO YOU BELONG TO ANY CLUBS OR ORGANIZATIONS SUCH AS CHURCH GROUPS UNIONS, FRATERNAL OR ATHLETIC GROUPS, OR SCHOOL GROUPS?: YES
HOW OFTEN DO YOU HAVE A DRINK CONTAINING ALCOHOL: NEVER

## 2023-06-19 ASSESSMENT — LIFESTYLE VARIABLES
HOW OFTEN DO YOU HAVE SIX OR MORE DRINKS ON ONE OCCASION: NEVER
HOW OFTEN DO YOU HAVE A DRINK CONTAINING ALCOHOL: NEVER
AUDIT-C TOTAL SCORE: 0
HOW MANY STANDARD DRINKS CONTAINING ALCOHOL DO YOU HAVE ON A TYPICAL DAY: PATIENT DOES NOT DRINK
SKIP TO QUESTIONS 9-10: 1

## 2023-06-20 ENCOUNTER — HOSPITAL ENCOUNTER (OUTPATIENT)
Dept: LAB | Facility: MEDICAL CENTER | Age: 63
End: 2023-06-20
Attending: INTERNAL MEDICINE
Payer: COMMERCIAL

## 2023-06-20 DIAGNOSIS — R73.03 PREDIABETES: ICD-10-CM

## 2023-06-20 DIAGNOSIS — E78.5 DYSLIPIDEMIA: ICD-10-CM

## 2023-06-20 DIAGNOSIS — E78.00 HYPERCHOLESTEROLEMIA: ICD-10-CM

## 2023-06-20 DIAGNOSIS — Z00.00 HEALTH CARE MAINTENANCE: ICD-10-CM

## 2023-06-20 DIAGNOSIS — Z86.2 HISTORY OF ANEMIA: ICD-10-CM

## 2023-06-20 DIAGNOSIS — Z13.29 THYROID DISORDER SCREEN: ICD-10-CM

## 2023-06-20 DIAGNOSIS — E55.9 VITAMIN D DEFICIENCY: ICD-10-CM

## 2023-06-20 LAB
25(OH)D3 SERPL-MCNC: 55 NG/ML (ref 30–100)
ALBUMIN SERPL BCP-MCNC: 4.4 G/DL (ref 3.2–4.9)
ALBUMIN/GLOB SERPL: 2 G/DL
ALP SERPL-CCNC: 43 U/L (ref 30–99)
ALT SERPL-CCNC: 13 U/L (ref 2–50)
ANION GAP SERPL CALC-SCNC: 7 MMOL/L (ref 7–16)
AST SERPL-CCNC: 17 U/L (ref 12–45)
BASOPHILS # BLD AUTO: 0.8 % (ref 0–1.8)
BASOPHILS # BLD: 0.04 K/UL (ref 0–0.12)
BILIRUB SERPL-MCNC: 0.4 MG/DL (ref 0.1–1.5)
BUN SERPL-MCNC: 10 MG/DL (ref 8–22)
CALCIUM ALBUM COR SERPL-MCNC: 9.4 MG/DL (ref 8.5–10.5)
CALCIUM SERPL-MCNC: 9.7 MG/DL (ref 8.5–10.5)
CHLORIDE SERPL-SCNC: 104 MMOL/L (ref 96–112)
CHOLEST SERPL-MCNC: 241 MG/DL (ref 100–199)
CO2 SERPL-SCNC: 26 MMOL/L (ref 20–33)
CREAT SERPL-MCNC: 0.68 MG/DL (ref 0.5–1.4)
CRP SERPL HS-MCNC: <0.3 MG/DL (ref 0–0.75)
EOSINOPHIL # BLD AUTO: 0.15 K/UL (ref 0–0.51)
EOSINOPHIL NFR BLD: 3 % (ref 0–6.9)
ERYTHROCYTE [DISTWIDTH] IN BLOOD BY AUTOMATED COUNT: 44.7 FL (ref 35.9–50)
EST. AVERAGE GLUCOSE BLD GHB EST-MCNC: 108 MG/DL
GFR SERPLBLD CREATININE-BSD FMLA CKD-EPI: 97 ML/MIN/1.73 M 2
GLOBULIN SER CALC-MCNC: 2.2 G/DL (ref 1.9–3.5)
GLUCOSE SERPL-MCNC: 87 MG/DL (ref 65–99)
HBA1C MFR BLD: 5.4 % (ref 4–5.6)
HCT VFR BLD AUTO: 41.7 % (ref 37–47)
HDLC SERPL-MCNC: 69 MG/DL
HGB BLD-MCNC: 13.5 G/DL (ref 12–16)
IMM GRANULOCYTES # BLD AUTO: 0.01 K/UL (ref 0–0.11)
IMM GRANULOCYTES NFR BLD AUTO: 0.2 % (ref 0–0.9)
LDLC SERPL CALC-MCNC: 158 MG/DL
LYMPHOCYTES # BLD AUTO: 1.65 K/UL (ref 1–4.8)
LYMPHOCYTES NFR BLD: 33.4 % (ref 22–41)
MCH RBC QN AUTO: 28.2 PG (ref 27–33)
MCHC RBC AUTO-ENTMCNC: 32.4 G/DL (ref 32.2–35.5)
MCV RBC AUTO: 87.2 FL (ref 81.4–97.8)
MONOCYTES # BLD AUTO: 0.48 K/UL (ref 0–0.85)
MONOCYTES NFR BLD AUTO: 9.7 % (ref 0–13.4)
NEUTROPHILS # BLD AUTO: 2.61 K/UL (ref 1.82–7.42)
NEUTROPHILS NFR BLD: 52.9 % (ref 44–72)
NRBC # BLD AUTO: 0 K/UL
NRBC BLD-RTO: 0 /100 WBC (ref 0–0.2)
PLATELET # BLD AUTO: 337 K/UL (ref 164–446)
PMV BLD AUTO: 10.5 FL (ref 9–12.9)
POTASSIUM SERPL-SCNC: 4.2 MMOL/L (ref 3.6–5.5)
PROT SERPL-MCNC: 6.6 G/DL (ref 6–8.2)
RBC # BLD AUTO: 4.78 M/UL (ref 4.2–5.4)
SODIUM SERPL-SCNC: 137 MMOL/L (ref 135–145)
TRIGL SERPL-MCNC: 71 MG/DL (ref 0–149)
TSH SERPL DL<=0.005 MIU/L-ACNC: 2.63 UIU/ML (ref 0.38–5.33)
WBC # BLD AUTO: 4.9 K/UL (ref 4.8–10.8)

## 2023-06-20 PROCEDURE — 85025 COMPLETE CBC W/AUTO DIFF WBC: CPT

## 2023-06-20 PROCEDURE — 80061 LIPID PANEL: CPT

## 2023-06-20 PROCEDURE — 80053 COMPREHEN METABOLIC PANEL: CPT

## 2023-06-20 PROCEDURE — 82306 VITAMIN D 25 HYDROXY: CPT

## 2023-06-20 PROCEDURE — 83036 HEMOGLOBIN GLYCOSYLATED A1C: CPT

## 2023-06-20 PROCEDURE — 84443 ASSAY THYROID STIM HORMONE: CPT

## 2023-06-20 PROCEDURE — 86140 C-REACTIVE PROTEIN: CPT

## 2023-06-20 PROCEDURE — 36415 COLL VENOUS BLD VENIPUNCTURE: CPT

## 2023-06-20 NOTE — TELEPHONE ENCOUNTER
Spoke to patient about Dermatology referral. Pt requested to move request up to a sooner appointment informed patient that Dr IRVIN had no openings however if there was urgent concern I could schedule with another provider. Pt requested MD offered schedule for Dr hathaway patient declined stating she would keep her appointment on 6/21/23 as she still needed to do her labs. Pt had no further questions at this time.

## 2023-06-21 ENCOUNTER — OFFICE VISIT (OUTPATIENT)
Dept: MEDICAL GROUP | Facility: MEDICAL CENTER | Age: 63
End: 2023-06-21
Payer: COMMERCIAL

## 2023-06-21 VITALS
OXYGEN SATURATION: 95 % | TEMPERATURE: 98.2 F | BODY MASS INDEX: 24.07 KG/M2 | WEIGHT: 140.98 LBS | HEART RATE: 67 BPM | DIASTOLIC BLOOD PRESSURE: 72 MMHG | HEIGHT: 64 IN | SYSTOLIC BLOOD PRESSURE: 124 MMHG

## 2023-06-21 DIAGNOSIS — H60.511 ACUTE ACTINIC OTITIS EXTERNA OF RIGHT EAR: ICD-10-CM

## 2023-06-21 DIAGNOSIS — H93.13 BILATERAL TINNITUS: ICD-10-CM

## 2023-06-21 DIAGNOSIS — E78.00 HYPERCHOLESTEROLEMIA: ICD-10-CM

## 2023-06-21 DIAGNOSIS — H26.9 CATARACT OF BOTH EYES, UNSPECIFIED CATARACT TYPE: ICD-10-CM

## 2023-06-21 DIAGNOSIS — D49.2 NEOPLASM OF SKIN OF FACE: ICD-10-CM

## 2023-06-21 DIAGNOSIS — R59.0 CERVICAL LYMPHADENOPATHY: ICD-10-CM

## 2023-06-21 DIAGNOSIS — H60.501 ACUTE OTITIS EXTERNA OF RIGHT EAR, UNSPECIFIED TYPE: ICD-10-CM

## 2023-06-21 PROCEDURE — 3074F SYST BP LT 130 MM HG: CPT | Performed by: INTERNAL MEDICINE

## 2023-06-21 PROCEDURE — 3078F DIAST BP <80 MM HG: CPT | Performed by: INTERNAL MEDICINE

## 2023-06-21 PROCEDURE — 99213 OFFICE O/P EST LOW 20 MIN: CPT | Mod: 25 | Performed by: INTERNAL MEDICINE

## 2023-06-21 PROCEDURE — 99396 PREV VISIT EST AGE 40-64: CPT | Performed by: INTERNAL MEDICINE

## 2023-06-21 RX ORDER — DIAZEPAM 2 MG/1
TABLET ORAL
COMMUNITY
Start: 2023-04-21 | End: 2023-06-21

## 2023-06-21 RX ORDER — CIPROFLOXACIN AND DEXAMETHASONE 3; 1 MG/ML; MG/ML
4 SUSPENSION/ DROPS AURICULAR (OTIC) 2 TIMES DAILY
Qty: 7.5 ML | Refills: 0 | Status: SHIPPED | OUTPATIENT
Start: 2023-06-21 | End: 2023-06-26

## 2023-06-21 ASSESSMENT — FIBROSIS 4 INDEX: FIB4 SCORE: 0.9

## 2023-06-21 ASSESSMENT — PATIENT HEALTH QUESTIONNAIRE - PHQ9: CLINICAL INTERPRETATION OF PHQ2 SCORE: 0

## 2023-06-21 NOTE — PROGRESS NOTES
Subjective:     HPI:  Rosemarie Falk is a 63 y.o. female who presents for annual exam and to address multiple problems. She is aware of double billing/copay for this visit.    CC:   Problem   Cervical Lymphadenopathy    Left submandibular lymphadenopathy, slightly tender, no recent dental infections, however multiple URIs.  May be reactive.       Bilateral Tinnitus    Bilateral tinnitus, interested in evaluation by ENT.       Acute Otitis Externa of Right Ear    Right ear is affected, Cipro otic drops     Neoplasm of Skin of Face    Right cheek/nasolabial fold, irregularly shaped erythematous lesion, 4 mm in the longest diameter.       Hypercholesterolemia    Lab Results   Component Value Date/Time    CHOLSTRLTOT 241 (H) 2023 06:19 AM     (H) 2023 06:19 AM    HDL 69 2023 06:19 AM    TRIGLYCERIDE 71 2023 06:19 AM       CT cardiac scorig 0   The 10-year ASCVD risk score (Moreno TERESA, et al., 2019) is: 4%  Advised on diet exercise and weight loss, Omega 3 supplemantation             Ob-Gyn/ History:    Patient has GYN provider: Yes,   /Para: 5/2/3  Last Pap Smear:  . Positive  history of abnormal pap smears. 20 + years ago   Gyn Surgery:  none   Current Contraceptive Method:  no   Post-menopausal bleeding: no  Urinary incontinence: none     Health Maintenance  Cholesterol Screenin2023   Diabetes Screening: BG WNL   Aspirin Use:  Diet: mediterrenean   Exercise: 7 days /week   Substance Abuse: none   Safe in relationship.  Seat belts, bike helmet, gun safety discussed.  Sun protection used.    Cancer screening  Colorectal Cancer Screenin, 10 years recall   Lung Cancer Screening: not indiacted   Cervical Cancer Screenin  Breast Cancer Screenin     Infectious disease screening/Immunizations  --STI Screening: deferred   --Practices safe sex.  --HIV Screening: deferred  --Hepatitis C Screening: UTD   --Immunizations:    Influenza: yearly    Tetanus:     She  has a past medical history of Allergy, Cataract, and Hypercholesterolemia.  She  has a past surgical history that includes tonsillectomy; tubal ligation; other; cataract extraction with iol (Right); eye surgery; and tubal coagulation laparoscopic bilateral.    Family History   Problem Relation Age of Onset    Cancer Mother          breast cancer at age 45    Hyperlipidemia Father     Hyperlipidemia Sister     Cancer Brother         Melanoma on face     Social History     Socioeconomic History    Marital status:      Spouse name: Not on file    Number of children: Not on file    Years of education: Not on file    Highest education level: Bachelor's degree (e.g., BA, AB, BS)   Occupational History    Not on file   Tobacco Use    Smoking status: Never    Smokeless tobacco: Never   Vaping Use    Vaping Use: Never used   Substance and Sexual Activity    Alcohol use: Not Currently     Comment: Prefer zero alcohol consumption    Drug use: Never    Sexual activity: Yes     Partners: Male     Birth control/protection: Post-Menopausal   Other Topics Concern    Not on file   Social History Narrative    Not on file     Social Determinants of Health     Financial Resource Strain: Low Risk  (2023)    Overall Financial Resource Strain (CARDIA)     Difficulty of Paying Living Expenses: Not very hard   Food Insecurity: No Food Insecurity (2023)    Hunger Vital Sign     Worried About Running Out of Food in the Last Year: Never true     Ran Out of Food in the Last Year: Never true   Transportation Needs: No Transportation Needs (2023)    PRAPARE - Transportation     Lack of Transportation (Medical): No     Lack of Transportation (Non-Medical): No   Physical Activity: Sufficiently Active (2023)    Exercise Vital Sign     Days of Exercise per Week: 7 days     Minutes of Exercise per Session: 60 min   Stress: No Stress Concern Present (2023)    [employer] [place] [employer] -  Occupational Stress Questionnaire     Feeling of Stress : Not at all   Social Connections: Socially Integrated (6/19/2023)    Social Connection and Isolation Panel [NHANES]     Frequency of Communication with Friends and Family: More than three times a week     Frequency of Social Gatherings with Friends and Family: More than three times a week     Attends Rastafarian Services: More than 4 times per year     Active Member of Clubs or Organizations: Yes     Attends Club or Organization Meetings: More than 4 times per year     Marital Status:    Intimate Partner Violence: Not on file   Housing Stability: Low Risk  (6/19/2023)    Housing Stability Vital Sign     Unable to Pay for Housing in the Last Year: No     Number of Places Lived in the Last Year: 1     Unstable Housing in the Last Year: No       Patient Active Problem List    Diagnosis Date Noted    Cervical lymphadenopathy 06/21/2023    Bilateral tinnitus 06/21/2023    Acute otitis externa of right ear 06/21/2023    Neoplasm of skin of face 06/21/2023    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 7.5 mL 0     No current facility-administered medications for this visit.     No Known Allergies    Review of Systems:  Constitutional: Negative for fever, chills and malaise/fatigue.   HENT: Negative for congestion.    Eyes: Negative for pain.    Respiratory: Negative for cough and  "shortness of breath.  Cardiovascular: Negative for leg swelling.   Gastrointestinal: Negative for nausea, vomiting, abdominal pain and diarrhea.   Genitourinary: Negative for dysuria and hematuria.   Skin: Negative for rash.   Neurological: Negative for dizziness, focal weakness and headaches.   Endo/Heme/Allergies: Does not bleed easily.   Psychiatric/Behavioral: Negative for depression.  The patient is not nervous/anxious.      Objective:     /72 (BP Location: Left arm, Patient Position: Sitting, BP Cuff Size: Adult)   Pulse 67   Temp 36.8 °C (98.2 °F) (Temporal)   Ht 1.626 m (5' 4\")   Wt 64 kg (140 lb 15.8 oz)   SpO2 95%   BMI 24.20 kg/m²   Body mass index is 24.2 kg/m².  Wt Readings from Last 4 Encounters:   06/21/23 64 kg (140 lb 15.8 oz)   01/04/22 65.4 kg (144 lb 2.9 oz)   05/20/21 62.8 kg (138 lb 7.2 oz)   11/06/20 65.4 kg (144 lb 2.9 oz)     Physical Exam  Constitutional:       General: She is not in acute distress.     Appearance: Normal appearance. She is normal weight. She is not ill-appearing, toxic-appearing or diaphoretic.   HENT:      Head: Normocephalic and atraumatic.        Right Ear: Tympanic membrane is erythematous.      Left Ear: Tympanic membrane, ear canal and external ear normal.      Nose: Nose normal.      Mouth/Throat:      Mouth: Mucous membranes are moist.      Pharynx: Oropharynx is clear.   Eyes:      General:         Right eye: No discharge.         Left eye: No discharge.      Extraocular Movements:      Right eye: Abnormal extraocular motion (not able to track) present.      Conjunctiva/sclera: Conjunctivae normal.      Comments: Right eye blindness   Cardiovascular:      Rate and Rhythm: Normal rate.      Pulses: Normal pulses.      Heart sounds: No murmur heard.     No friction rub.   Pulmonary:      Effort: Pulmonary effort is normal. No respiratory distress.      Breath sounds: No stridor. No wheezing.   Abdominal:      General: Abdomen is flat.      Palpations: " Abdomen is soft.      Tenderness: There is no abdominal tenderness. There is no guarding.   Musculoskeletal:         General: No swelling or deformity.      Right lower leg: No edema.      Left lower leg: No edema.   Feet:      Comments:     Lymphadenopathy:      Cervical: Cervical adenopathy present.   Neurological:      Mental Status: She is alert.   Psychiatric:         Mood and Affect: Mood normal.         Behavior: Behavior normal.       Labs: Reviewed and discussed results of CBC, CMP, TSH, A1c vitamin D from 6/20/2023     Assessment and Plan:     Problem List Items Addressed This Visit       Hypercholesterolemia (Chronic)     Chronic, stable, low ASCVD score.  Continue healthful lifestyle measures, omega-3 supplementation.           Acute otitis externa of right ear    Relevant Medications    ciprofloxacin/dexamethasone (CIPRODEX) 0.3-0.1 % Suspension    Bilateral tinnitus    Relevant Orders    Referral to ENT    Cervical lymphadenopathy     May be secondary to reactive lymphadenopathy.          Relevant Orders    US-SOFT TISSUES OF HEAD - NECK    Neoplasm of skin of face     New problem, patient noticed few days ago, it opened up while she was taking a shower, however would need to be evaluated by dermatologist due to extensive family history of skin cancer and sun exposure.           Relevant Orders    Referral to Dermatology     Other Visit Diagnoses       Cataract of both eyes, unspecified cataract type        Relevant Orders    Referral to Ophthalmology          HCM:  as above   Labs per orders  Immunizations per orders  Patient counseled about skin care, diet, supplements, prenatal vitamins, safe sex and exercise.    Follow-up: Return in about 1 year (around 6/21/2024), or if symptoms worsen or fail to improve.    Please note that this dictation was created using voice recognition software. I have made every reasonable attempt to correct obvious errors, but I expect that there are errors of grammar and  possibly content that I did not discover before finalizing the note.

## 2023-06-21 NOTE — ASSESSMENT & PLAN NOTE
Chronic, stable, low ASCVD score.  Continue healthful lifestyle measures, omega-3 supplementation.

## 2023-06-21 NOTE — ASSESSMENT & PLAN NOTE
New problem, patient noticed few days ago, it opened up while she was taking a shower, however would need to be evaluated by dermatologist due to extensive family history of skin cancer and sun exposure.

## 2023-06-26 ENCOUNTER — TELEPHONE (OUTPATIENT)
Dept: MEDICAL GROUP | Facility: MEDICAL CENTER | Age: 63
End: 2023-06-26

## 2023-06-26 NOTE — TELEPHONE ENCOUNTER
Jenny nurse      Pt request to see Dr. EMERY ophthalmology  Denied.  member has no out of network benefits     In Network:  [facility]   [facility]  [facility]

## 2023-06-28 ENCOUNTER — APPOINTMENT (RX ONLY)
Dept: URBAN - METROPOLITAN AREA CLINIC 35 | Facility: CLINIC | Age: 63
Setting detail: DERMATOLOGY
End: 2023-06-28

## 2023-06-28 DIAGNOSIS — L81.4 OTHER MELANIN HYPERPIGMENTATION: ICD-10-CM

## 2023-06-28 DIAGNOSIS — L82.1 OTHER SEBORRHEIC KERATOSIS: ICD-10-CM

## 2023-06-28 DIAGNOSIS — D18.0 HEMANGIOMA: ICD-10-CM

## 2023-06-28 DIAGNOSIS — Z71.89 OTHER SPECIFIED COUNSELING: ICD-10-CM

## 2023-06-28 DIAGNOSIS — D22 MELANOCYTIC NEVI: ICD-10-CM

## 2023-06-28 PROBLEM — D18.01 HEMANGIOMA OF SKIN AND SUBCUTANEOUS TISSUE: Status: ACTIVE | Noted: 2023-06-28

## 2023-06-28 PROBLEM — D22.5 MELANOCYTIC NEVI OF TRUNK: Status: ACTIVE | Noted: 2023-06-28

## 2023-06-28 PROCEDURE — ? SUNSCREEN RECOMMENDATIONS

## 2023-06-28 PROCEDURE — ? COUNSELING

## 2023-06-28 PROCEDURE — ? OBSERVATION AND MEASURE

## 2023-06-28 PROCEDURE — 99213 OFFICE O/P EST LOW 20 MIN: CPT

## 2023-06-28 ASSESSMENT — LOCATION DETAILED DESCRIPTION DERM
LOCATION DETAILED: RIGHT INFERIOR MEDIAL UPPER BACK
LOCATION DETAILED: SUPERIOR LUMBAR SPINE
LOCATION DETAILED: INFERIOR LUMBAR SPINE
LOCATION DETAILED: INFERIOR THORACIC SPINE

## 2023-06-28 ASSESSMENT — LOCATION SIMPLE DESCRIPTION DERM
LOCATION SIMPLE: UPPER BACK
LOCATION SIMPLE: RIGHT UPPER BACK
LOCATION SIMPLE: LOWER BACK

## 2023-06-28 ASSESSMENT — LOCATION ZONE DERM: LOCATION ZONE: TRUNK

## 2023-06-30 ENCOUNTER — APPOINTMENT (OUTPATIENT)
Dept: RADIOLOGY | Facility: MEDICAL CENTER | Age: 63
End: 2023-06-30
Attending: INTERNAL MEDICINE
Payer: COMMERCIAL

## 2023-06-30 DIAGNOSIS — R59.0 CERVICAL LYMPHADENOPATHY: ICD-10-CM

## 2023-06-30 PROCEDURE — 76536 US EXAM OF HEAD AND NECK: CPT

## 2023-07-03 ENCOUNTER — PATIENT MESSAGE (OUTPATIENT)
Dept: HEALTH INFORMATION MANAGEMENT | Facility: OTHER | Age: 63
End: 2023-07-03

## 2023-07-05 PROBLEM — E04.1 THYROID CYST: Status: ACTIVE | Noted: 2023-07-05

## 2023-07-12 DIAGNOSIS — H93.13 BILATERAL TINNITUS: ICD-10-CM

## 2024-06-11 ENCOUNTER — APPOINTMENT (RX ONLY)
Dept: URBAN - METROPOLITAN AREA CLINIC 35 | Facility: CLINIC | Age: 64
Setting detail: DERMATOLOGY
End: 2024-06-11

## 2024-06-11 DIAGNOSIS — D22 MELANOCYTIC NEVI: ICD-10-CM

## 2024-06-11 DIAGNOSIS — D18.0 HEMANGIOMA: ICD-10-CM

## 2024-06-11 DIAGNOSIS — Z71.89 OTHER SPECIFIED COUNSELING: ICD-10-CM

## 2024-06-11 DIAGNOSIS — L82.0 INFLAMED SEBORRHEIC KERATOSIS: ICD-10-CM

## 2024-06-11 DIAGNOSIS — R60.0 LOCALIZED EDEMA: ICD-10-CM | Status: INADEQUATELY CONTROLLED

## 2024-06-11 DIAGNOSIS — L90.5 SCAR CONDITIONS AND FIBROSIS OF SKIN: ICD-10-CM

## 2024-06-11 DIAGNOSIS — L81.4 OTHER MELANIN HYPERPIGMENTATION: ICD-10-CM

## 2024-06-11 DIAGNOSIS — L82.1 OTHER SEBORRHEIC KERATOSIS: ICD-10-CM

## 2024-06-11 PROBLEM — D22.5 MELANOCYTIC NEVI OF TRUNK: Status: ACTIVE | Noted: 2024-06-11

## 2024-06-11 PROBLEM — D18.01 HEMANGIOMA OF SKIN AND SUBCUTANEOUS TISSUE: Status: ACTIVE | Noted: 2024-06-11

## 2024-06-11 PROBLEM — D22.61 MELANOCYTIC NEVI OF RIGHT UPPER LIMB, INCLUDING SHOULDER: Status: ACTIVE | Noted: 2024-06-11

## 2024-06-11 PROCEDURE — ? TREATMENT REGIMEN

## 2024-06-11 PROCEDURE — ? ADDITIONAL NOTES

## 2024-06-11 PROCEDURE — ? SUNSCREEN RECOMMENDATIONS

## 2024-06-11 PROCEDURE — ? OBSERVATION AND MEASURE

## 2024-06-11 PROCEDURE — ? COUNSELING

## 2024-06-11 PROCEDURE — 99213 OFFICE O/P EST LOW 20 MIN: CPT

## 2024-06-11 ASSESSMENT — LOCATION SIMPLE DESCRIPTION DERM
LOCATION SIMPLE: LOWER BACK
LOCATION SIMPLE: SCALP
LOCATION SIMPLE: RIGHT UPPER BACK
LOCATION SIMPLE: ABDOMEN
LOCATION SIMPLE: UPPER BACK
LOCATION SIMPLE: LEFT CHEEK
LOCATION SIMPLE: RIGHT SHOULDER
LOCATION SIMPLE: RIGHT CHEEK

## 2024-06-11 ASSESSMENT — LOCATION DETAILED DESCRIPTION DERM
LOCATION DETAILED: INFERIOR LUMBAR SPINE
LOCATION DETAILED: PERIUMBILICAL SKIN
LOCATION DETAILED: RIGHT POSTERIOR SHOULDER
LOCATION DETAILED: LEFT SUPERIOR PARIETAL SCALP
LOCATION DETAILED: RIGHT INFERIOR MEDIAL UPPER BACK
LOCATION DETAILED: LEFT SUPERIOR CENTRAL MALAR CHEEK
LOCATION DETAILED: INFERIOR THORACIC SPINE
LOCATION DETAILED: SUPERIOR LUMBAR SPINE
LOCATION DETAILED: RIGHT SUPERIOR MEDIAL MALAR CHEEK

## 2024-06-11 ASSESSMENT — LOCATION ZONE DERM
LOCATION ZONE: TRUNK
LOCATION ZONE: FACE
LOCATION ZONE: ARM
LOCATION ZONE: SCALP